# Patient Record
Sex: FEMALE | Race: WHITE | Employment: FULL TIME | URBAN - METROPOLITAN AREA
[De-identification: names, ages, dates, MRNs, and addresses within clinical notes are randomized per-mention and may not be internally consistent; named-entity substitution may affect disease eponyms.]

---

## 2017-01-10 PROBLEM — M67.432 GANGLION CYST OF VOLAR ASPECT OF LEFT WRIST: Status: ACTIVE | Noted: 2017-01-10

## 2017-01-17 ENCOUNTER — HOSPITAL ENCOUNTER (OUTPATIENT)
Dept: CT IMAGING | Facility: HOSPITAL | Age: 52
Discharge: HOME | End: 2017-01-17
Attending: INTERNAL MEDICINE

## 2017-01-17 DIAGNOSIS — Z13.9 SCREENING PROCEDURE: ICD-10-CM

## 2017-05-20 ENCOUNTER — OFFICE VISIT (OUTPATIENT)
Dept: FAMILY MEDICINE CLINIC | Facility: CLINIC | Age: 52
End: 2017-05-20

## 2017-05-20 VITALS
OXYGEN SATURATION: 98 % | HEART RATE: 92 BPM | RESPIRATION RATE: 20 BRPM | DIASTOLIC BLOOD PRESSURE: 70 MMHG | HEIGHT: 66 IN | SYSTOLIC BLOOD PRESSURE: 110 MMHG | BODY MASS INDEX: 22.5 KG/M2 | WEIGHT: 140 LBS | TEMPERATURE: 98 F

## 2017-05-20 DIAGNOSIS — J01.00 ACUTE MAXILLARY SINUSITIS, RECURRENCE NOT SPECIFIED: Primary | ICD-10-CM

## 2017-05-20 DIAGNOSIS — R05.9 COUGH: ICD-10-CM

## 2017-05-20 DIAGNOSIS — R09.89 CHEST CONGESTION: ICD-10-CM

## 2017-05-20 PROCEDURE — 99213 OFFICE O/P EST LOW 20 MIN: CPT | Performed by: NURSE PRACTITIONER

## 2017-05-20 RX ORDER — DOXYCYCLINE HYCLATE 100 MG/1
100 CAPSULE ORAL 2 TIMES DAILY
Qty: 14 CAPSULE | Refills: 0 | Status: SHIPPED | OUTPATIENT
Start: 2017-05-20 | End: 2017-05-27

## 2017-05-20 NOTE — PATIENT INSTRUCTIONS
·  PLAN: Doxycycline take as directed. Finish all the medication even if you feel better. Sunscreen during sun exposure while on medication.   · Probiotics or yogurt daily during antibiotic use may help decrease stomach upset and restore good bacteria to th

## 2017-05-20 NOTE — PROGRESS NOTES
HPI:   Kwesi Ramsey is a 46year old female who presents with ill symptoms for  8  days.  Patient reports congestion, green/yellow colored nasal discharge, dry cough, cough is not keeping pt up at night, chest pain from coughing, ear pain, OTC cold med breath with exertion, cough as above  CARDIOVASCULAR: as above in HPI  GI: no nausea or abdominal pain, appetite down  NEURO: admits to headaches    EXAM:   /70 mmHg  Temp(Src) 97.9 °F (36.6 °C) (Oral)  Resp 20  Ht 66\"  Wt 140 lb  BMI 22.61 kg/m2  L foods and soups can help with throat pain and swelling as well. · Hand washing-use hand  or wash hands frequently, cover your cough or sneeze, do not share towels or drinks with others.   · May take Delsym over the counter every 12 hours if neede

## 2017-06-01 ENCOUNTER — TELEPHONE (OUTPATIENT)
Dept: INTERNAL MEDICINE CLINIC | Facility: CLINIC | Age: 52
End: 2017-06-01

## 2017-06-01 ENCOUNTER — OFFICE VISIT (OUTPATIENT)
Dept: INTERNAL MEDICINE CLINIC | Facility: CLINIC | Age: 52
End: 2017-06-01

## 2017-06-01 VITALS
BODY MASS INDEX: 23 KG/M2 | TEMPERATURE: 98 F | DIASTOLIC BLOOD PRESSURE: 68 MMHG | SYSTOLIC BLOOD PRESSURE: 102 MMHG | WEIGHT: 140 LBS | HEART RATE: 64 BPM | RESPIRATION RATE: 16 BRPM | OXYGEN SATURATION: 96 %

## 2017-06-01 DIAGNOSIS — J06.9 ACUTE URI: Primary | ICD-10-CM

## 2017-06-01 PROCEDURE — 99213 OFFICE O/P EST LOW 20 MIN: CPT | Performed by: PHYSICIAN ASSISTANT

## 2017-06-01 RX ORDER — AZITHROMYCIN 250 MG/1
TABLET, FILM COATED ORAL
Qty: 6 TABLET | Refills: 0 | Status: SHIPPED | OUTPATIENT
Start: 2017-06-01 | End: 2017-08-21 | Stop reason: ALTCHOICE

## 2017-06-01 RX ORDER — ALBUTEROL SULFATE 90 UG/1
2 AEROSOL, METERED RESPIRATORY (INHALATION) EVERY 4 HOURS PRN
Qty: 1 INHALER | Refills: 0 | Status: ON HOLD | OUTPATIENT
Start: 2017-06-01 | End: 2017-10-25

## 2017-06-01 RX ORDER — PREDNISONE 10 MG/1
TABLET ORAL
Qty: 18 TABLET | Refills: 0 | Status: SHIPPED | OUTPATIENT
Start: 2017-06-01 | End: 2017-08-21 | Stop reason: ALTCHOICE

## 2017-06-01 NOTE — PROGRESS NOTES
HPI:   Laura Razo is a 46year old female who presents for upper respiratory symptoms for  3  weeks.    Patient reports sore throat only at the beginning of sx's, congestion, cough with sputum production at times, OTC cold meds have not been helping, Arrhythmia Father    • Cancer Father      colon   • CHF [Other] [OTHER] Maternal Grandmother    • Diabetes Mother    • Hypertension Paternal Grandmother    • Diabetes Maternal Grandfather         Smoking Status: Never Smoker                      Smokeless HFA (PROAIR HFA) 108 (90 Base) MCG/ACT Inhalation Aero Soln 1 Inhaler 0      Sig: Inhale 2 puffs into the lungs every 4 (four) hours as needed for Shortness of Breath (cough).            Imaging & Consults:  None    Return if symptoms worsen or fail to impr

## 2017-06-01 NOTE — TELEPHONE ENCOUNTER
Pt stating was seen on 5/20/17 [facility] for URI. Given Doxycycline Hyclate 100 MG and had completed on Sunday. Cough returned on Monday, deep cough. Slight SOB. No chest pain. Dull discomfort, similar to muscle ache, on back scapula. No fever at this time.  No

## 2017-08-17 ENCOUNTER — PATIENT MESSAGE (OUTPATIENT)
Dept: INTERNAL MEDICINE CLINIC | Facility: CLINIC | Age: 52
End: 2017-08-17

## 2017-08-17 NOTE — TELEPHONE ENCOUNTER
From: Efren Potts  To: Ysabel Omer PA-C  Sent: 8/17/2017 10:12 AM CDT  Subject: Other    Josem Tunica, Since May I've had symptoms like in 3/2016.  The symptoms never totally resolved and this past May I had episodes of left leg burning and on 2 o

## 2017-08-21 ENCOUNTER — OFFICE VISIT (OUTPATIENT)
Dept: INTERNAL MEDICINE CLINIC | Facility: CLINIC | Age: 52
End: 2017-08-21

## 2017-08-21 VITALS
HEIGHT: 66 IN | BODY MASS INDEX: 22.5 KG/M2 | SYSTOLIC BLOOD PRESSURE: 108 MMHG | TEMPERATURE: 99 F | WEIGHT: 140 LBS | DIASTOLIC BLOOD PRESSURE: 70 MMHG | HEART RATE: 76 BPM

## 2017-08-21 DIAGNOSIS — M54.16 LUMBAR RADICULOPATHY: Primary | ICD-10-CM

## 2017-08-21 PROCEDURE — 99213 OFFICE O/P EST LOW 20 MIN: CPT | Performed by: NURSE PRACTITIONER

## 2017-08-21 RX ORDER — METHYLPREDNISOLONE 4 MG/1
TABLET ORAL
Qty: 1 KIT | Refills: 0 | Status: SHIPPED | OUTPATIENT
Start: 2017-08-21 | End: 2017-09-07 | Stop reason: ALTCHOICE

## 2017-08-21 NOTE — PROGRESS NOTES
Alison Cunha is a 46year old female. Patient presents with:  Low Back Pain: low back pain that is going down to her hips for the last 2 weeks.  She's had back pain for 2 years but worse in the last 2 weeks      HPI:   Presents for eval of low back pa Alcohol use: Yes           1.2 oz/week     Standard drinks or equivalent: 2 per week     Comment: Cage done 8-21-17    Family History   Problem Relation Age of Onset   • Alcohol and Other Disorders Associated Maternal Grandfather    • Arrhythmia Fath on file for this visit.

## 2017-09-07 ENCOUNTER — OFFICE VISIT (OUTPATIENT)
Dept: INTERNAL MEDICINE CLINIC | Facility: CLINIC | Age: 52
End: 2017-09-07

## 2017-09-07 VITALS
SYSTOLIC BLOOD PRESSURE: 100 MMHG | WEIGHT: 147 LBS | DIASTOLIC BLOOD PRESSURE: 62 MMHG | TEMPERATURE: 99 F | RESPIRATION RATE: 16 BRPM | BODY MASS INDEX: 23.63 KG/M2 | HEIGHT: 66 IN | HEART RATE: 76 BPM

## 2017-09-07 DIAGNOSIS — G89.29 CHRONIC BILATERAL LOW BACK PAIN WITHOUT SCIATICA: Primary | ICD-10-CM

## 2017-09-07 DIAGNOSIS — M54.50 CHRONIC BILATERAL LOW BACK PAIN WITHOUT SCIATICA: Primary | ICD-10-CM

## 2017-09-07 PROCEDURE — 99213 OFFICE O/P EST LOW 20 MIN: CPT | Performed by: PHYSICIAN ASSISTANT

## 2017-09-07 RX ORDER — TIZANIDINE HYDROCHLORIDE 2 MG/1
2 CAPSULE, GELATIN COATED ORAL NIGHTLY PRN
Qty: 20 CAPSULE | Refills: 0 | Status: SHIPPED | OUTPATIENT
Start: 2017-09-07 | End: 2017-10-06

## 2017-09-07 RX ORDER — TRAMADOL HYDROCHLORIDE 50 MG/1
50 TABLET ORAL EVERY 8 HOURS PRN
Qty: 20 TABLET | Refills: 0 | Status: SHIPPED | OUTPATIENT
Start: 2017-09-07 | End: 2017-10-06 | Stop reason: ALTCHOICE

## 2017-09-07 NOTE — PROGRESS NOTES
Patient presents with: Follow - Up: back pain , still there per patient \"pretty much\" last night and this morning \"super bad\"       HPI:  Pt presents with persistent low back pain.   Feels really started about 4-5 mos ago but significantly worse over t Breath (cough). Disp: 1 Inhaler Rfl: 0   Cholecalciferol (VITAMIN D) 1000 UNITS Oral Cap Take 1 tablet by mouth daily. Disp:  Rfl:    Multiple Vitamin (STRESS FORMULA 500/BIOTIN) Oral Tab Take  by mouth.  Disp:  Rfl:        Physical Exam  /62 (BP Loca visit. All questions were answered and the patient understands the plan.

## 2017-09-08 ENCOUNTER — HOSPITAL ENCOUNTER (OUTPATIENT)
Dept: MRI IMAGING | Facility: HOSPITAL | Age: 52
Discharge: HOME | End: 2017-09-08
Attending: PHYSICIAN ASSISTANT
Payer: COMMERCIAL

## 2017-09-08 DIAGNOSIS — M54.16 LUMBAR BACK PAIN WITH RADICULOPATHY AFFECTING LEFT LOWER EXTREMITY: Primary | ICD-10-CM

## 2017-09-08 DIAGNOSIS — M54.50 CHRONIC BILATERAL LOW BACK PAIN WITHOUT SCIATICA: ICD-10-CM

## 2017-09-08 DIAGNOSIS — M51.36 DDD (DEGENERATIVE DISC DISEASE), LUMBAR: ICD-10-CM

## 2017-09-08 DIAGNOSIS — M48.061 FORAMINAL STENOSIS OF LUMBAR REGION: ICD-10-CM

## 2017-09-08 DIAGNOSIS — G89.29 CHRONIC BILATERAL LOW BACK PAIN WITHOUT SCIATICA: ICD-10-CM

## 2017-09-08 PROCEDURE — 72148 MRI LUMBAR SPINE W/O DYE: CPT | Performed by: PHYSICIAN ASSISTANT

## 2017-09-08 RX ORDER — GABAPENTIN 100 MG/1
CAPSULE ORAL
Qty: 90 CAPSULE | Refills: 0 | Status: SHIPPED | OUTPATIENT
Start: 2017-09-08 | End: 2017-10-06 | Stop reason: ALTCHOICE

## 2017-09-08 NOTE — PROGRESS NOTES
DDD, worst at L4-5 and L5-S1. Mild R foraminal encroachment at L4-5. Also with L foraminal stenosis possibly compromising the L nerve root. . Reviewed results with pt personally. She will see Neurosurg in consultation.   Ultram did not help her pain last

## 2017-09-12 ENCOUNTER — TELEPHONE (OUTPATIENT)
Dept: INTERNAL MEDICINE CLINIC | Facility: CLINIC | Age: 52
End: 2017-09-12

## 2017-09-12 NOTE — TELEPHONE ENCOUNTER
Pt [facility] so she can review them herself before her appt w/neuro next week-thanks

## 2017-09-12 NOTE — TELEPHONE ENCOUNTER
Results released to [facility] as requested. Results had been reviewed by CB with pt personally.

## 2017-09-20 ENCOUNTER — OFFICE VISIT (OUTPATIENT)
Dept: SURGERY | Facility: CLINIC | Age: 52
End: 2017-09-20

## 2017-09-20 VITALS
HEART RATE: 78 BPM | BODY MASS INDEX: 22.5 KG/M2 | WEIGHT: 140 LBS | DIASTOLIC BLOOD PRESSURE: 68 MMHG | SYSTOLIC BLOOD PRESSURE: 110 MMHG | HEIGHT: 66 IN

## 2017-09-20 DIAGNOSIS — G95.9 CERVICAL MYELOPATHY (HCC): Primary | ICD-10-CM

## 2017-09-20 DIAGNOSIS — M47.12 OSTEOARTHRITIS OF CERVICAL SPINE WITH MYELOPATHY: ICD-10-CM

## 2017-09-20 DIAGNOSIS — M51.16 LUMBAR DISC HERNIATION WITH RADICULOPATHY: ICD-10-CM

## 2017-09-20 PROCEDURE — 99214 OFFICE O/P EST MOD 30 MIN: CPT | Performed by: PHYSICIAN ASSISTANT

## 2017-09-20 RX ORDER — METHYLPREDNISOLONE 4 MG/1
TABLET ORAL
Qty: 1 PACKAGE | Refills: 0 | Status: SHIPPED | OUTPATIENT
Start: 2017-09-20 | End: 2017-10-06 | Stop reason: ALTCHOICE

## 2017-09-20 RX ORDER — IBUPROFEN 200 MG
200 TABLET ORAL EVERY 6 HOURS PRN
COMMUNITY
End: 2017-10-16

## 2017-09-20 RX ORDER — HYDROCODONE BITARTRATE AND ACETAMINOPHEN 10; 325 MG/1; MG/1
1 TABLET ORAL EVERY 6 HOURS PRN
Qty: 90 TABLET | Refills: 0 | Status: ON HOLD | OUTPATIENT
Start: 2017-09-20 | End: 2017-10-26

## 2017-09-20 RX ORDER — CYCLOBENZAPRINE HCL 10 MG
10 TABLET ORAL 3 TIMES DAILY PRN
Qty: 60 TABLET | Refills: 1 | Status: ON HOLD | OUTPATIENT
Start: 2017-09-20 | End: 2017-10-27

## 2017-09-20 NOTE — PATIENT INSTRUCTIONS
Refill policies:    • Allow 2-3 business days for refills; controlled substances may take longer.   • Contact your pharmacy at least 5 days prior to running out of medication and have them send an electronic request or submit request through the [facility] have a procedure or additional testing performed. [facility]) will contact your insurance carrier to obtain pre-certification or prior authorization.     Unfortunately, JB has seen an increase in denial of payment even though the p

## 2017-09-20 NOTE — PROGRESS NOTES
Location of Pain: neck pain radiating down left arm with numbness and tingling in left hand  Back pain radiating into left buttocks and down left leg with numbness and tingling into foot    Date Pain Began: back:April 2017, Neck: 5 days ago          Work R

## 2017-09-20 NOTE — H&P
[facility] Neurosurgery Consultation      HISTORY OF PRESENT Lilia Lyman is a 46year old RH female here for spinal consultation. She has had back pain on and off for several months. Her back pain generally is about a 4/10.   Currently she has no family history includes Alcohol and Other Disorders Associated in her maternal grandfather; Arrhythmia in her father; CHF in her maternal grandmother; Cancer in her father; Diabetes in her maternal grandfather and mother; Hypertension in her paternal grand HEENT:  Normocephalic, atraumatic. She sits and stands with her head tilted to the right. Her left arm pain is better with placing her left arm overhead. SKIN: Warm, dry, no rashes. NEUROLOGICAL:  This patient is alert and orientated x 3.   Speech flu AP and lateral x-rays on the CT abdomen  views from 3/20/2013 show very mild lumbar scoliosis with spondylosis at L5-S1. Normal pelvic and hip anatomy.   No apparent pars defect      MRI of the lumbar spine from 3/13/15 shows spondylosis at L4-5 L5-S1

## 2017-09-21 ENCOUNTER — HOSPITAL ENCOUNTER (OUTPATIENT)
Dept: GENERAL RADIOLOGY | Age: 52
Discharge: HOME | End: 2017-09-21
Attending: PHYSICIAN ASSISTANT
Payer: COMMERCIAL

## 2017-09-21 DIAGNOSIS — M47.12 OSTEOARTHRITIS OF CERVICAL SPINE WITH MYELOPATHY: ICD-10-CM

## 2017-09-21 DIAGNOSIS — G95.9 CERVICAL MYELOPATHY (HCC): ICD-10-CM

## 2017-09-21 PROCEDURE — 72052 X-RAY EXAM NECK SPINE 6/>VWS: CPT | Performed by: PHYSICIAN ASSISTANT

## 2017-09-22 ENCOUNTER — OFFICE VISIT (OUTPATIENT)
Dept: SURGERY | Facility: CLINIC | Age: 52
End: 2017-09-22

## 2017-09-22 VITALS — HEART RATE: 88 BPM | SYSTOLIC BLOOD PRESSURE: 108 MMHG | RESPIRATION RATE: 18 BRPM | DIASTOLIC BLOOD PRESSURE: 70 MMHG

## 2017-09-22 DIAGNOSIS — G95.9 CERVICAL MYELOPATHY (HCC): Primary | ICD-10-CM

## 2017-09-22 DIAGNOSIS — M47.12 OSTEOARTHRITIS OF CERVICAL SPINE WITH MYELOPATHY: ICD-10-CM

## 2017-09-22 DIAGNOSIS — M51.16 LUMBAR DISC HERNIATION WITH RADICULOPATHY: ICD-10-CM

## 2017-09-22 PROCEDURE — 99213 OFFICE O/P EST LOW 20 MIN: CPT | Performed by: PHYSICIAN ASSISTANT

## 2017-09-22 NOTE — PROGRESS NOTES
Patient her to discuss her cervical MRI. Patient states burning to the left arm has subsided, otherwise neck/shoulder pain is still the same.

## 2017-09-22 NOTE — PATIENT INSTRUCTIONS
Refill policies:    • Allow 2-3 business days for refills; controlled substances may take longer.   • Contact your pharmacy at least 5 days prior to running out of medication and have them send an electronic request or submit request through the [facility] have a procedure or additional testing performed. [facility]) will contact your insurance carrier to obtain pre-certification or prior authorization.     Unfortunately, JB has seen an increase in denial of payment even though the p

## 2017-09-22 NOTE — PROGRESS NOTES
[facility] Neurosurgery follow-up        HISTORY OF PRESENT Nya Rodriguez is a 46year old RH female here in follow-up. She had an MRI of her cervical spine and x-rays of her cervical spine.   She has been taking the Norco Flexeril and Medrol she is m • Personal history of urinary calculi     • Visual impairment       glasses, contacts         PAST SURGICAL HISTORY:  Past Surgical History:  4/26/13: COLONOSCOPY  2015: ERCP,DIAGNOSTIC  No date: FOREARM/WRIST SURGERY UNLISTED      Comment: right wrist fx Albuterol Sulfate HFA (PROAIR HFA) 108 (90 Base) MCG/ACT Inhalation Aero Soln Inhale 2 puffs into the lungs every 4 (four) hours as needed for Shortness of Breath (cough).  Disp: 1 Inhaler Rfl: 0      No current facility-administered medications on file tyson Right      2+           2+       1+        2+       2+           Left      2+           2+       1+        2+       2+                IMAGING:  MRI of the cervical spine 9/22/17.   Left C2-3, left C3-4, bilateral C6-7 foraminal stenosis  C3-4 central stenos 2.    Left C2-3, left C3-4, bilateral C6-7 foraminal stenosis. C3-4, C6-7 central stenosis with myelopathy  3. Left left arm radiculopathy acute  4. Thoracolumbar scoliosis  5.   L4-5, L5-S1 spondylosis with facet arthropathy and left foraminal narrowing

## 2017-09-25 ENCOUNTER — OFFICE VISIT (OUTPATIENT)
Dept: SURGERY | Facility: CLINIC | Age: 52
End: 2017-09-25

## 2017-09-25 ENCOUNTER — TELEPHONE (OUTPATIENT)
Dept: INTERNAL MEDICINE CLINIC | Facility: CLINIC | Age: 52
End: 2017-09-25

## 2017-09-25 VITALS
WEIGHT: 140 LBS | DIASTOLIC BLOOD PRESSURE: 64 MMHG | HEIGHT: 66 IN | SYSTOLIC BLOOD PRESSURE: 108 MMHG | HEART RATE: 78 BPM | RESPIRATION RATE: 16 BRPM | OXYGEN SATURATION: 98 % | BODY MASS INDEX: 22.5 KG/M2

## 2017-09-25 DIAGNOSIS — M54.12 CERVICAL RADICULOPATHY: Primary | ICD-10-CM

## 2017-09-25 DIAGNOSIS — Z13.228 SCREENING FOR ENDOCRINE, METABOLIC AND IMMUNITY DISORDER: ICD-10-CM

## 2017-09-25 DIAGNOSIS — Z13.29 SCREENING FOR ENDOCRINE, METABOLIC AND IMMUNITY DISORDER: ICD-10-CM

## 2017-09-25 DIAGNOSIS — Z13.220 SCREENING FOR LIPID DISORDERS: ICD-10-CM

## 2017-09-25 DIAGNOSIS — Z13.0 SCREENING FOR DISORDER OF BLOOD AND BLOOD-FORMING ORGANS: ICD-10-CM

## 2017-09-25 DIAGNOSIS — E04.1 THYROID NODULE: Primary | ICD-10-CM

## 2017-09-25 DIAGNOSIS — Z13.0 SCREENING FOR ENDOCRINE, METABOLIC AND IMMUNITY DISORDER: ICD-10-CM

## 2017-09-25 PROCEDURE — 99204 OFFICE O/P NEW MOD 45 MIN: CPT | Performed by: ANESTHESIOLOGY

## 2017-09-25 NOTE — TELEPHONE ENCOUNTER
Message   Received:  Today   Message Contents   Augusto Melendez PA-C  [facility] Clinical Staff             Pt was found to have 2 mm thyroid nodule on cervical MRI.  She should do full set of labs as she is overdue (CBC, CMP, lipids, TSH with reflex FT4) i

## 2017-09-25 NOTE — PATIENT INSTRUCTIONS
Refill policies:    • Allow 2-3 business days for refills; controlled substances may take longer.   • Contact your pharmacy at least 5 days prior to running out of medication and have them send an electronic request or submit request through the [facility] have a procedure or additional testing performed. [facility]) will contact your insurance carrier to obtain pre-certification or prior authorization.     Unfortunately, JB has seen an increase in denial of payment even though the p device off for procedure.         Medication:   Number of days you need to be off for the following medications:  • Aggrenox 10 days   • Agrylin (Anagrelide) 10 days   • Enbrel (Etanercept) 24 hours   • Fragmin (Dalteparin) 24 hours   • Pletal (Cilostazol) increase in your blood sugar. Contact your primary care physician if your blood sugar rises as you may require some medication adjustment.   It is normal to have increased pain at injection site for up to 3-5 days after procedure, you can use heat for the local anesthetic. Some patients choose to receive intravenous sedation that can make the procedure easier to tolerate. This type of sedation may cause amnesia and patients may not remember parts or all of the actual procedure.    How is the epidural injecti more. If three injections have not helped you very much, you may be a candidate for a different procedure. It is important to keep follow up appointment to clearly communicate with your provider how you are feeling. Will the epidural injection help me?   I

## 2017-09-25 NOTE — PROGRESS NOTES
HPI:    Patient ID: Kalie Smith is a 46year old female. HPI    Review of Systems         Current Outpatient Prescriptions:  ibuprofen 200 MG Oral Tab Take 200 mg by mouth every 6 (six) hours as needed for Pain.  Disp:  Rfl:    methylPREDNISolone ( Location of Pain: neck and left arm    Date Pain Began: 9/2016          Work Related:   No        Receiving Work Comp/Disability:   No    Numeric Rating Scale:  Pain at Present:  2

## 2017-09-25 NOTE — H&P
Name: Sahara Gorman   : 1965   DOS: 2017     Chief complaint: Neck pain   History of present illness:  Sahara Gorman is a 46year old female complaining of 10 day history of neck pain with radiation down the left arm.   The patient was Take 1 capsule (2 mg total) by mouth nightly as needed for Muscle spasms.  Disp: 20 capsule Rfl: 0   Albuterol Sulfate HFA (PROAIR HFA) 108 (90 Base) MCG/ACT Inhalation Aero Soln Inhale 2 puffs into the lungs every 4 (four) hours as needed for Shortness of radiculopathy on the left.     Motor Examination:    (R)   (L)  Deltoid:      5    5  Biceps:       5    5  Triceps:      5    5  Wrist Extension:     5    5  Wrist Flexsion:                 5    5  Finger Extension:     5    5  Finger Flexsion:     5    5 options are discussed in detail as well. She can see me for approximately 1 week after the injection. Thank you for this kind consultation. Christel Thomas M.D.   Pain Management

## 2017-09-26 ENCOUNTER — TELEPHONE (OUTPATIENT)
Dept: NEUROLOGY | Facility: CLINIC | Age: 52
End: 2017-09-26

## 2017-09-26 NOTE — TELEPHONE ENCOUNTER
Spoke to Bev Coffey at [facility], codes 23361 are valid and billable, no prior authorization or predetermination required.  Call reference: [id number] call time 29:36

## 2017-09-26 NOTE — TELEPHONE ENCOUNTER
Labs ordered as recommended by CB. Disucssed nodule with pt in detail. Pt agreeable to plan. Aware will [address] next year and that it is too small to biopsy at this time.

## 2017-09-27 ENCOUNTER — OFFICE VISIT (OUTPATIENT)
Dept: PHYSICAL THERAPY | Age: 52
End: 2017-09-27
Attending: PHYSICIAN ASSISTANT
Payer: COMMERCIAL

## 2017-09-27 DIAGNOSIS — G95.9 CERVICAL MYELOPATHY (HCC): ICD-10-CM

## 2017-09-27 DIAGNOSIS — M47.12 OSTEOARTHRITIS OF CERVICAL SPINE WITH MYELOPATHY: ICD-10-CM

## 2017-09-27 PROCEDURE — 97162 PT EVAL MOD COMPLEX 30 MIN: CPT | Performed by: PHYSICAL THERAPIST

## 2017-09-27 PROCEDURE — 97140 MANUAL THERAPY 1/> REGIONS: CPT | Performed by: PHYSICAL THERAPIST

## 2017-09-27 PROCEDURE — 97110 THERAPEUTIC EXERCISES: CPT | Performed by: PHYSICAL THERAPIST

## 2017-09-27 NOTE — PROGRESS NOTES
SPINE EVALUATION:   Referring Physician: Dr. Roney Patricia  Diagnosis: Cervical myelopathy, radiculopathy     Date of Service: 9/27/2017     PATIENT SUMMARY   Jose Suárez is a 46year old y/o female who presents to therapy today with complaints of gordo L lateral glide and LE strength deficits. Pt presents with return of radicular symptoms to L arm right away from supine to sitting with loading of spine.  Pt has difficulty sitting without pain, bending forward to  things, sleeping and getting through neural tension in LUE to decrease radicular symptoms. Pt responded well to manual distraction, suboccipital release and nerve glides with lower cervical lateral glides centralizing symptoms. However, return of symptoms noted in sitting.  Seated towel distra 44/100      Patient/Family/Caregiver was advised of these findings, precautions, and treatment options and has agreed to actively participate in planning and for this course of care.     Thank you for your referral. Please co-sign or sign and return this le

## 2017-09-28 ENCOUNTER — OFFICE VISIT (OUTPATIENT)
Dept: PHYSICAL THERAPY | Age: 52
End: 2017-09-28
Attending: PHYSICIAN ASSISTANT
Payer: COMMERCIAL

## 2017-09-28 PROCEDURE — 97110 THERAPEUTIC EXERCISES: CPT | Performed by: PHYSICAL THERAPIST

## 2017-09-28 PROCEDURE — 97140 MANUAL THERAPY 1/> REGIONS: CPT | Performed by: PHYSICAL THERAPIST

## 2017-09-28 NOTE — PROGRESS NOTES
Dx: Cervical myelopathy, radiculopathy         Authorized # of Visits:           Next MD visit: none scheduled  Fall Risk: standard         Precautions: n/a             Subjective: Pt reports feeling better when waking up this morning stating it was not as X 2, man there X 1       Total Timed Treatment: 45 min  Total Treatment Time: 45 min

## 2017-10-02 ENCOUNTER — OFFICE VISIT (OUTPATIENT)
Dept: PHYSICAL THERAPY | Age: 52
End: 2017-10-02
Attending: PHYSICIAN ASSISTANT
Payer: COMMERCIAL

## 2017-10-02 PROCEDURE — 97140 MANUAL THERAPY 1/> REGIONS: CPT | Performed by: PHYSICAL THERAPIST

## 2017-10-02 PROCEDURE — 97110 THERAPEUTIC EXERCISES: CPT | Performed by: PHYSICAL THERAPIST

## 2017-10-02 PROCEDURE — 97012 MECHANICAL TRACTION THERAPY: CPT | Performed by: PHYSICAL THERAPIST

## 2017-10-02 NOTE — PROGRESS NOTES
Dx: Cervical myelopathy, radiculopathy         Authorized # of Visits:           Next MD visit: none scheduled  Fall Risk: standard         Precautions: n/a             Subjective: Pt reports pain at 5/10 - significantly better since start of PT, did not h 10        Cervical distraction with retraction X 10 Mechanical traction cervical 30:5 sec on: off, 15#:5# on: off X 10 min        Man there: central PA, lateral glides, distraction X 15 min Man there: passive nerve glides with lateral cervical glides, PA,

## 2017-10-05 ENCOUNTER — OFFICE VISIT (OUTPATIENT)
Dept: PHYSICAL THERAPY | Age: 52
End: 2017-10-05
Attending: PHYSICIAN ASSISTANT
Payer: COMMERCIAL

## 2017-10-05 PROCEDURE — 97140 MANUAL THERAPY 1/> REGIONS: CPT | Performed by: PHYSICAL THERAPIST

## 2017-10-05 PROCEDURE — 97110 THERAPEUTIC EXERCISES: CPT | Performed by: PHYSICAL THERAPIST

## 2017-10-05 NOTE — PROGRESS NOTES
Dx: Cervical myelopathy, radiculopathy         Authorized # of Visits:           Next MD visit: none scheduled  Fall Risk: standard         Precautions: n/a             Subjective: Pt states she felt more tightness in neck after traction last session.  Stat abd X 15       Median nerve glide X 10 Prone gordo sh ext X 10 Prone gordo sh ext X 15       Cervical distraction with retraction X 10 Mechanical traction cervical 30:5 sec on: off, 15#:5# on: off X 10 min Man there : manual traction intermittent with central

## 2017-10-06 ENCOUNTER — OFFICE VISIT (OUTPATIENT)
Dept: SURGERY | Facility: CLINIC | Age: 52
End: 2017-10-06

## 2017-10-06 VITALS
DIASTOLIC BLOOD PRESSURE: 60 MMHG | WEIGHT: 140 LBS | BODY MASS INDEX: 22.5 KG/M2 | HEART RATE: 88 BPM | SYSTOLIC BLOOD PRESSURE: 106 MMHG | HEIGHT: 66 IN

## 2017-10-06 DIAGNOSIS — M54.12 CERVICAL RADICULOPATHY: Primary | ICD-10-CM

## 2017-10-06 DIAGNOSIS — G95.9 CERVICAL MYELOPATHY (HCC): ICD-10-CM

## 2017-10-06 PROCEDURE — 99213 OFFICE O/P EST LOW 20 MIN: CPT | Performed by: PHYSICIAN ASSISTANT

## 2017-10-06 NOTE — PATIENT INSTRUCTIONS
Refill policies:    • Allow 2-3 business days for refills; controlled substances may take longer.   • Contact your pharmacy at least 5 days prior to running out of medication and have them send an electronic request or submit request through the [facility] have a procedure or additional testing performed. [facility]) will contact your insurance carrier to obtain pre-certification or prior authorization.     Unfortunately, JB has seen an increase in denial of payment even though the p

## 2017-10-06 NOTE — PROGRESS NOTES
[facility] Neurosurgery follow-up        HISTORY OF PRESENT Moustapha Llamas is a 46year old RH female here in follow-up. She has seen Dr. Matias Primrose and is set up for cervical epidural steroid injections.     State her left arm pain has improved it is a 2/10 here for spinal consultation. She has had back pain on and off for several months. Her back pain generally is about a 4/10. Currently she has no back pain. The back pain was worse when she is lying down at night.   She is getting achiness in her bilater family history includes Alcohol and Other Disorders Associated in her maternal grandfather; Arrhythmia in her father; CHF in her maternal grandmother; Cancer in her father; Diabetes in her maternal grandfather and mother; Hypertension in her paternal grand HEENT:  Normocephalic, atraumatic. She sits and stands with her head tilted to the right. Her left arm pain is better with placing her left arm overhead. SKIN: Warm, dry, no rashes.     NEUROLOGICAL:  This patient is alert and orientated x 3.   Speech fl X-rays of cervical spine 10/18/2012 C3-4 spondylosis with retrolisthesis and probable acquired stenosis.   C5-6 C6-7 spondylosis       X-rays of the thoracic spine 10/18/2012 shows mild spondylosis with a very small scoliosis       X-ray of the pelvis and h

## 2017-10-09 ENCOUNTER — APPOINTMENT (OUTPATIENT)
Dept: PHYSICAL THERAPY | Age: 52
End: 2017-10-09
Attending: PHYSICIAN ASSISTANT
Payer: COMMERCIAL

## 2017-10-09 ENCOUNTER — TELEPHONE (OUTPATIENT)
Dept: SURGERY | Facility: CLINIC | Age: 52
End: 2017-10-09

## 2017-10-10 ENCOUNTER — TELEPHONE (OUTPATIENT)
Dept: NEUROLOGY | Facility: CLINIC | Age: 52
End: 2017-10-10

## 2017-10-12 ENCOUNTER — TELEPHONE (OUTPATIENT)
Dept: SURGERY | Facility: CLINIC | Age: 52
End: 2017-10-12

## 2017-10-12 ENCOUNTER — APPOINTMENT (OUTPATIENT)
Dept: PHYSICAL THERAPY | Age: 52
End: 2017-10-12
Attending: PHYSICIAN ASSISTANT
Payer: COMMERCIAL

## 2017-10-12 ENCOUNTER — OFFICE VISIT (OUTPATIENT)
Dept: SURGERY | Facility: CLINIC | Age: 52
End: 2017-10-12

## 2017-10-12 VITALS
HEIGHT: 66 IN | WEIGHT: 140 LBS | HEART RATE: 88 BPM | SYSTOLIC BLOOD PRESSURE: 114 MMHG | DIASTOLIC BLOOD PRESSURE: 76 MMHG | BODY MASS INDEX: 22.5 KG/M2

## 2017-10-12 DIAGNOSIS — M54.12 CERVICAL RADICULOPATHY: ICD-10-CM

## 2017-10-12 DIAGNOSIS — G95.9 CERVICAL MYELOPATHY (HCC): Primary | ICD-10-CM

## 2017-10-12 DIAGNOSIS — M47.12 OSTEOARTHRITIS OF CERVICAL SPINE WITH MYELOPATHY: ICD-10-CM

## 2017-10-12 PROCEDURE — 99214 OFFICE O/P EST MOD 30 MIN: CPT | Performed by: PHYSICIAN ASSISTANT

## 2017-10-12 NOTE — PROGRESS NOTES
Here for surgical discussion. Upper extremity weakness has not changed since last visit. Occasional tingling to lower extremities.

## 2017-10-12 NOTE — PROGRESS NOTES
[facility] Neurosurgery follow-up        HISTORY OF PRESENT Lilia Lyman is a 46year old RH female here in follow-up. Last hx  She has seen Dr. Eddy Ying and is set up for cervical epidural steroid injections.     State her left arm pain has improved here for spinal consultation. She has had back pain on and off for several months. Her back pain generally is about a 4/10. Currently she has no back pain. The back pain was worse when she is lying down at night.   She is getting achiness in her bilater family history includes Alcohol and Other Disorders Associated in her maternal grandfather; Arrhythmia in her father; CHF in her maternal grandmother; Cancer in her father; Diabetes in her maternal grandfather and mother; Hypertension in her paternal grand HEENT:  Normocephalic, atraumatic. She sits and stands with her head tilted to the right. Her left arm pain is better with placing her left arm overhead. SKIN: Warm, dry, no rashes.     NEUROLOGICAL:  This patient is alert and orientated x 3.   Speech fl X-rays of cervical spine 10/18/2012 C3-4 spondylosis with retrolisthesis and probable acquired stenosis.   C5-6 C6-7 spondylosis       X-rays of the thoracic spine 10/18/2012 shows mild spondylosis with a very small scoliosis       X-ray of the pelvis and h Risks and benefits were discussed at length. To included expected outcome from surgery, unexpected outcomes from surgery, and associated risks with any surgical procedure.  To include infection, neurological injury, failure to resolve pain or symptoms, and

## 2017-10-16 ENCOUNTER — APPOINTMENT (OUTPATIENT)
Dept: PHYSICAL THERAPY | Age: 52
End: 2017-10-16
Attending: PHYSICIAN ASSISTANT
Payer: COMMERCIAL

## 2017-10-16 NOTE — TELEPHONE ENCOUNTER
LMTCB. Will send preop instructions via [facility] along with information on the collar and vendor information. Orders placed for cervical collar and External bone growth stimulator and faxed to vendors. Letter sent to PCP office for medical clearance.

## 2017-10-18 ENCOUNTER — LABORATORY ENCOUNTER (OUTPATIENT)
Dept: LAB | Facility: HOSPITAL | Age: 52
End: 2017-10-18
Attending: NEUROLOGICAL SURGERY
Payer: COMMERCIAL

## 2017-10-18 ENCOUNTER — HOSPITAL ENCOUNTER (OUTPATIENT)
Dept: PHYSICAL THERAPY | Facility: HOSPITAL | Age: 52
Discharge: HOME | End: 2017-10-18
Attending: NEUROLOGICAL SURGERY
Payer: COMMERCIAL

## 2017-10-18 ENCOUNTER — TELEPHONE (OUTPATIENT)
Dept: SURGERY | Facility: CLINIC | Age: 52
End: 2017-10-18

## 2017-10-18 DIAGNOSIS — G95.9 CERVICAL MYELOPATHY (HCC): ICD-10-CM

## 2017-10-18 DIAGNOSIS — Z13.0 SCREENING FOR ENDOCRINE, METABOLIC AND IMMUNITY DISORDER: ICD-10-CM

## 2017-10-18 DIAGNOSIS — Z13.0 SCREENING FOR DISORDER OF BLOOD AND BLOOD-FORMING ORGANS: ICD-10-CM

## 2017-10-18 DIAGNOSIS — Z13.228 SCREENING FOR ENDOCRINE, METABOLIC AND IMMUNITY DISORDER: ICD-10-CM

## 2017-10-18 DIAGNOSIS — M54.12 CERVICAL RADICULOPATHY: ICD-10-CM

## 2017-10-18 DIAGNOSIS — Z13.29 SCREENING FOR ENDOCRINE, METABOLIC AND IMMUNITY DISORDER: ICD-10-CM

## 2017-10-18 DIAGNOSIS — Z13.220 SCREENING FOR LIPID DISORDERS: ICD-10-CM

## 2017-10-18 DIAGNOSIS — E04.1 THYROID NODULE: ICD-10-CM

## 2017-10-18 PROCEDURE — 85730 THROMBOPLASTIN TIME PARTIAL: CPT

## 2017-10-18 PROCEDURE — 87081 CULTURE SCREEN ONLY: CPT

## 2017-10-18 PROCEDURE — 86850 RBC ANTIBODY SCREEN: CPT

## 2017-10-18 PROCEDURE — 36415 COLL VENOUS BLD VENIPUNCTURE: CPT

## 2017-10-18 PROCEDURE — 80061 LIPID PANEL: CPT

## 2017-10-18 PROCEDURE — 86900 BLOOD TYPING SEROLOGIC ABO: CPT

## 2017-10-18 PROCEDURE — 85025 COMPLETE CBC W/AUTO DIFF WBC: CPT

## 2017-10-18 PROCEDURE — 86901 BLOOD TYPING SEROLOGIC RH(D): CPT

## 2017-10-18 PROCEDURE — 80053 COMPREHEN METABOLIC PANEL: CPT

## 2017-10-18 PROCEDURE — 85610 PROTHROMBIN TIME: CPT

## 2017-10-18 PROCEDURE — 84443 ASSAY THYROID STIM HORMONE: CPT

## 2017-10-18 NOTE — PROGRESS NOTES
Labs OK other than elevated TGs. Watch carbs and simple sugars. Recheck in 1 year. Other labs OK/stable.

## 2017-10-18 NOTE — TELEPHONE ENCOUNTER
Auth # [id number] for 1 inpatient day, certified up to 85/38, additional days check with the hospital.    PCP clearance pending.

## 2017-10-18 NOTE — TELEPHONE ENCOUNTER
Patient came into the office and asked about hibiclens. Informed her she can get this OTC at any local pharmacy by the first aide supplies.  Also, informed patient of below insurance authorization and reminded her that she also needs to f/u with her PCP for

## 2017-10-19 ENCOUNTER — APPOINTMENT (OUTPATIENT)
Dept: PHYSICAL THERAPY | Age: 52
End: 2017-10-19
Attending: PHYSICIAN ASSISTANT
Payer: COMMERCIAL

## 2017-10-20 NOTE — TELEPHONE ENCOUNTER
Spoke with patient and informed her that paperwork is complete and she can  copy. Patient also asked if we could fax to [employer]. Fax # [phone]. Copy sent to scanning and copy left in  for patient to .

## 2017-10-23 ENCOUNTER — OFFICE VISIT (OUTPATIENT)
Dept: INTERNAL MEDICINE CLINIC | Facility: CLINIC | Age: 52
End: 2017-10-23

## 2017-10-23 VITALS
TEMPERATURE: 99 F | HEART RATE: 92 BPM | WEIGHT: 146 LBS | SYSTOLIC BLOOD PRESSURE: 112 MMHG | RESPIRATION RATE: 17 BRPM | BODY MASS INDEX: 23.46 KG/M2 | HEIGHT: 66 IN | DIASTOLIC BLOOD PRESSURE: 64 MMHG

## 2017-10-23 DIAGNOSIS — Z01.818 PRE-OP EXAMINATION: ICD-10-CM

## 2017-10-23 DIAGNOSIS — M50.20 HERNIATED CERVICAL DISC: Primary | ICD-10-CM

## 2017-10-23 DIAGNOSIS — M54.12 CERVICAL RADICULOPATHY: ICD-10-CM

## 2017-10-23 PROCEDURE — 99243 OFF/OP CNSLTJ NEW/EST LOW 30: CPT | Performed by: INTERNAL MEDICINE

## 2017-10-23 NOTE — PROGRESS NOTES
Patient presents with:  Pre-Op Exam: ROOM 8      HPI:  Here for pre op for cervical disc herniation with radiculopathy, having cervical fusion on Wednesday. Pt is doing well other than neck pain with radicular symptoms. Labs reviewed wnl.      Review of Sys History:  4/26/13: COLONOSCOPY  2015: ERCP,DIAGNOSTIC  No date: FOREARM/WRIST SURGERY UNLISTED      Comment: right wrist fx repair  2015, 2016: FOREARM/WRIST SURGERY UNLISTED Left      Comment: left wrist ganglion cyst excision   No date: OTHER SURGICAL HI (Oral)   Resp 17   Ht 66\"   Wt 146 lb   LMP 05/28/2014   BMI 23.57 kg/m²   Constitutional: Oriented to person, place, and time. No distress. HEENT:  Normocephalic and atraumatic.  Hearing and tympanic membranes normal.  Nose normal. Oropharynx is clear a

## 2017-10-23 NOTE — TELEPHONE ENCOUNTER
Per [facility] today 10/23/17: \"Pt is acceptable risk for surgery, no additional testing needed. Labs wnl. \"    Nothing further needed for upcoming surgery.

## 2017-10-25 ENCOUNTER — APPOINTMENT (OUTPATIENT)
Dept: GENERAL RADIOLOGY | Facility: HOSPITAL | Age: 52
DRG: 472 | End: 2017-10-25
Attending: NEUROLOGICAL SURGERY
Payer: COMMERCIAL

## 2017-10-25 ENCOUNTER — HOSPITAL ENCOUNTER (INPATIENT)
Facility: HOSPITAL | Age: 52
LOS: 1 days | Discharge: HOME | DRG: 472 | End: 2017-10-27
Attending: NEUROLOGICAL SURGERY | Admitting: NEUROLOGICAL SURGERY
Payer: COMMERCIAL

## 2017-10-25 ENCOUNTER — SURGERY (OUTPATIENT)
Age: 52
End: 2017-10-25

## 2017-10-25 DIAGNOSIS — G95.9 CERVICAL MYELOPATHY (HCC): Primary | ICD-10-CM

## 2017-10-25 DIAGNOSIS — M54.12 CERVICAL RADICULOPATHY: ICD-10-CM

## 2017-10-25 PROCEDURE — 76001 XR FLUOROSCOPE EXAM >1 HR EXTENSIVE (CPT=76001): CPT | Performed by: NEUROLOGICAL SURGERY

## 2017-10-25 PROCEDURE — 0RB30ZZ EXCISION OF CERVICAL VERTEBRAL DISC, OPEN APPROACH: ICD-10-PCS | Performed by: NEUROLOGICAL SURGERY

## 2017-10-25 PROCEDURE — 0RG20K0 FUSION OF 2 OR MORE CERVICAL VERTEBRAL JOINTS WITH NONAUTOLOGOUS TISSUE SUBSTITUTE, ANTERIOR APPROACH, ANTERIOR COLUMN, OPEN APPROACH: ICD-10-PCS | Performed by: NEUROLOGICAL SURGERY

## 2017-10-25 PROCEDURE — 00NW0ZZ RELEASE CERVICAL SPINAL CORD, OPEN APPROACH: ICD-10-PCS | Performed by: NEUROLOGICAL SURGERY

## 2017-10-25 DEVICE — SCREW EAGLE+SWIFT PRM ST 12: Type: IMPLANTABLE DEVICE | Site: NECK | Status: FUNCTIONAL

## 2017-10-25 DEVICE — BONE CERV 5 LIFENET VG2C-T57: Type: IMPLANTABLE DEVICE | Site: NECK | Status: FUNCTIONAL

## 2017-10-25 DEVICE — EAGLE/SWIFT PLUS ANTERIOR CERVICAL PLATE STRAIGHT - TEMPORARY FIXATION PIN
Type: IMPLANTABLE DEVICE | Site: NECK | Status: FUNCTIONAL
Brand: EAGLE SWIFT

## 2017-10-25 DEVICE — SCREW EAGLE+SWIFT PRM ST 14: Type: IMPLANTABLE DEVICE | Site: NECK | Status: FUNCTIONAL

## 2017-10-25 RX ORDER — HYDROMORPHONE HYDROCHLORIDE 1 MG/ML
0.4 INJECTION, SOLUTION INTRAMUSCULAR; INTRAVENOUS; SUBCUTANEOUS EVERY 5 MIN PRN
Status: DISPENSED | OUTPATIENT
Start: 2017-10-25 | End: 2017-10-26

## 2017-10-25 RX ORDER — DEXAMETHASONE SODIUM PHOSPHATE 4 MG/ML
4 VIAL (ML) INJECTION AS NEEDED
Status: ACTIVE | OUTPATIENT
Start: 2017-10-25 | End: 2017-10-26

## 2017-10-25 RX ORDER — MIDAZOLAM HYDROCHLORIDE 1 MG/ML
1 INJECTION INTRAMUSCULAR; INTRAVENOUS EVERY 5 MIN PRN
Status: ACTIVE | OUTPATIENT
Start: 2017-10-25 | End: 2017-10-26

## 2017-10-25 RX ORDER — NALOXONE HYDROCHLORIDE 0.4 MG/ML
80 INJECTION, SOLUTION INTRAMUSCULAR; INTRAVENOUS; SUBCUTANEOUS AS NEEDED
Status: ACTIVE | OUTPATIENT
Start: 2017-10-25 | End: 2017-10-26

## 2017-10-25 RX ORDER — CEFAZOLIN SODIUM 1 G/3ML
INJECTION, POWDER, FOR SOLUTION INTRAMUSCULAR; INTRAVENOUS
Status: DISCONTINUED | OUTPATIENT
Start: 2017-10-25 | End: 2017-10-25 | Stop reason: HOSPADM

## 2017-10-25 RX ORDER — ONDANSETRON 2 MG/ML
4 INJECTION INTRAMUSCULAR; INTRAVENOUS AS NEEDED
Status: ACTIVE | OUTPATIENT
Start: 2017-10-25 | End: 2017-10-26

## 2017-10-25 RX ORDER — METOCLOPRAMIDE HYDROCHLORIDE 5 MG/ML
10 INJECTION INTRAMUSCULAR; INTRAVENOUS AS NEEDED
Status: ACTIVE | OUTPATIENT
Start: 2017-10-25 | End: 2017-10-26

## 2017-10-25 RX ORDER — BACITRACIN 50000 [USP'U]/1
INJECTION, POWDER, LYOPHILIZED, FOR SOLUTION INTRAMUSCULAR AS NEEDED
Status: DISCONTINUED | OUTPATIENT
Start: 2017-10-25 | End: 2017-10-25 | Stop reason: HOSPADM

## 2017-10-25 RX ORDER — SODIUM CHLORIDE, SODIUM LACTATE, POTASSIUM CHLORIDE, CALCIUM CHLORIDE 600; 310; 30; 20 MG/100ML; MG/100ML; MG/100ML; MG/100ML
INJECTION, SOLUTION INTRAVENOUS CONTINUOUS
Status: DISCONTINUED | OUTPATIENT
Start: 2017-10-25 | End: 2017-10-27

## 2017-10-25 RX ORDER — DIPHENHYDRAMINE HYDROCHLORIDE 50 MG/ML
12.5 INJECTION INTRAMUSCULAR; INTRAVENOUS AS NEEDED
Status: ACTIVE | OUTPATIENT
Start: 2017-10-25 | End: 2017-10-26

## 2017-10-25 RX ORDER — MEPERIDINE HYDROCHLORIDE 25 MG/ML
12.5 INJECTION INTRAMUSCULAR; INTRAVENOUS; SUBCUTANEOUS AS NEEDED
Status: DISCONTINUED | OUTPATIENT
Start: 2017-10-25 | End: 2017-10-26 | Stop reason: HOSPADM

## 2017-10-25 RX ORDER — 0.9 % SODIUM CHLORIDE 0.9 %
VIAL (ML) INJECTION AS NEEDED
Status: DISCONTINUED | OUTPATIENT
Start: 2017-10-25 | End: 2017-10-25 | Stop reason: HOSPADM

## 2017-10-25 RX ORDER — HYDROMORPHONE HYDROCHLORIDE 1 MG/ML
INJECTION, SOLUTION INTRAMUSCULAR; INTRAVENOUS; SUBCUTANEOUS
Status: COMPLETED
Start: 2017-10-25 | End: 2017-10-25

## 2017-10-25 RX ORDER — SODIUM CHLORIDE 9 MG/ML
INJECTION, SOLUTION INTRAVENOUS CONTINUOUS
Status: DISCONTINUED | OUTPATIENT
Start: 2017-10-25 | End: 2017-10-27

## 2017-10-25 NOTE — H&P
[id number]   History and Physical    Herminio Jordan Patient Status:  Surgery Admit    1965 MRN [id number]   Location [address] [place] [address] Attending Milagros Leroy MD   Hosp Day # 0 PCP Ed Bose here for spinal consultation. Gael Aranda has had back pain on and off for several months. Fredy Roe back pain generally is about a 4/10.  Currently she has no back pain.  The back pain was worse when she is lying down at night.  She is getting achiness in her bilater family history includes Alcohol and Other Disorders Associated in her maternal grandfather; Arrhythmia in her father; CHF in her maternal grandmother; Cancer in her father; Diabetes in her maternal grandfather and mother; Hypertension in her paternal grand Upper extremity strength:     Deltoid Triceps Biceps   Wrist Extension  Finger extension Thumb Abduction  Thumb adduction Intrinsics   Right         5-        2+      4+    4+ 5 4+ 4 5 4+   Left         5-        4-       4    4+ 5 4- 4 5 4+      Lower

## 2017-10-26 ENCOUNTER — APPOINTMENT (OUTPATIENT)
Dept: GENERAL RADIOLOGY | Facility: HOSPITAL | Age: 52
DRG: 472 | End: 2017-10-26
Attending: NURSE PRACTITIONER
Payer: COMMERCIAL

## 2017-10-26 PROCEDURE — 72040 X-RAY EXAM NECK SPINE 2-3 VW: CPT | Performed by: NURSE PRACTITIONER

## 2017-10-26 PROCEDURE — 99233 SBSQ HOSP IP/OBS HIGH 50: CPT | Performed by: HOSPITALIST

## 2017-10-26 RX ORDER — METOCLOPRAMIDE HYDROCHLORIDE 5 MG/ML
10 INJECTION INTRAMUSCULAR; INTRAVENOUS EVERY 6 HOURS PRN
Status: DISCONTINUED | OUTPATIENT
Start: 2017-10-26 | End: 2017-10-27

## 2017-10-26 RX ORDER — HYDROCODONE BITARTRATE AND ACETAMINOPHEN 10; 325 MG/1; MG/1
2 TABLET ORAL EVERY 4 HOURS PRN
Status: DISCONTINUED | OUTPATIENT
Start: 2017-10-26 | End: 2017-10-27

## 2017-10-26 RX ORDER — HYDROCODONE BITARTRATE AND ACETAMINOPHEN 10; 325 MG/1; MG/1
1 TABLET ORAL EVERY 4 HOURS PRN
Status: DISCONTINUED | OUTPATIENT
Start: 2017-10-26 | End: 2017-10-27

## 2017-10-26 RX ORDER — DIPHENHYDRAMINE HYDROCHLORIDE 50 MG/ML
25 INJECTION INTRAMUSCULAR; INTRAVENOUS EVERY 4 HOURS PRN
Status: DISCONTINUED | OUTPATIENT
Start: 2017-10-26 | End: 2017-10-27

## 2017-10-26 RX ORDER — BISACODYL 10 MG
10 SUPPOSITORY, RECTAL RECTAL
Status: DISCONTINUED | OUTPATIENT
Start: 2017-10-26 | End: 2017-10-27

## 2017-10-26 RX ORDER — ACETAMINOPHEN 325 MG/1
650 TABLET ORAL EVERY 4 HOURS PRN
Status: DISCONTINUED | OUTPATIENT
Start: 2017-10-26 | End: 2017-10-27

## 2017-10-26 RX ORDER — DIPHENHYDRAMINE HCL 25 MG
25 CAPSULE ORAL EVERY 4 HOURS PRN
Status: DISCONTINUED | OUTPATIENT
Start: 2017-10-26 | End: 2017-10-27

## 2017-10-26 RX ORDER — HYDROMORPHONE HYDROCHLORIDE 1 MG/ML
0.2 INJECTION, SOLUTION INTRAMUSCULAR; INTRAVENOUS; SUBCUTANEOUS EVERY 2 HOUR PRN
Status: DISCONTINUED | OUTPATIENT
Start: 2017-10-26 | End: 2017-10-27

## 2017-10-26 RX ORDER — SENNOSIDES 8.6 MG
17.2 TABLET ORAL NIGHTLY
Status: DISCONTINUED | OUTPATIENT
Start: 2017-10-26 | End: 2017-10-27

## 2017-10-26 RX ORDER — HYDROMORPHONE HYDROCHLORIDE 1 MG/ML
0.8 INJECTION, SOLUTION INTRAMUSCULAR; INTRAVENOUS; SUBCUTANEOUS EVERY 2 HOUR PRN
Status: DISCONTINUED | OUTPATIENT
Start: 2017-10-26 | End: 2017-10-27

## 2017-10-26 RX ORDER — DOCUSATE SODIUM 100 MG/1
100 CAPSULE, LIQUID FILLED ORAL 2 TIMES DAILY
Status: DISCONTINUED | OUTPATIENT
Start: 2017-10-26 | End: 2017-10-27

## 2017-10-26 RX ORDER — DEXAMETHASONE SODIUM PHOSPHATE 10 MG/ML
10 INJECTION, SOLUTION INTRAMUSCULAR; INTRAVENOUS EVERY 8 HOURS
Status: COMPLETED | OUTPATIENT
Start: 2017-10-26 | End: 2017-10-26

## 2017-10-26 RX ORDER — SODIUM PHOSPHATE, DIBASIC AND SODIUM PHOSPHATE, MONOBASIC 7; 19 G/133ML; G/133ML
1 ENEMA RECTAL ONCE AS NEEDED
Status: DISCONTINUED | OUTPATIENT
Start: 2017-10-26 | End: 2017-10-27

## 2017-10-26 RX ORDER — HYDROCODONE BITARTRATE AND ACETAMINOPHEN 10; 325 MG/1; MG/1
1-2 TABLET ORAL EVERY 6 HOURS PRN
Qty: 40 TABLET | Refills: 0 | Status: ON HOLD | OUTPATIENT
Start: 2017-10-26 | End: 2017-12-08

## 2017-10-26 RX ORDER — CYCLOBENZAPRINE HCL 10 MG
10 TABLET ORAL EVERY 8 HOURS PRN
Status: DISCONTINUED | OUTPATIENT
Start: 2017-10-26 | End: 2017-10-27

## 2017-10-26 RX ORDER — ONDANSETRON 2 MG/ML
4 INJECTION INTRAMUSCULAR; INTRAVENOUS EVERY 4 HOURS PRN
Status: ACTIVE | OUTPATIENT
Start: 2017-10-26 | End: 2017-10-27

## 2017-10-26 RX ORDER — POLYETHYLENE GLYCOL 3350 17 G/17G
17 POWDER, FOR SOLUTION ORAL DAILY PRN
Status: DISCONTINUED | OUTPATIENT
Start: 2017-10-26 | End: 2017-10-27

## 2017-10-26 RX ORDER — HYDROMORPHONE HYDROCHLORIDE 1 MG/ML
0.4 INJECTION, SOLUTION INTRAMUSCULAR; INTRAVENOUS; SUBCUTANEOUS EVERY 2 HOUR PRN
Status: DISCONTINUED | OUTPATIENT
Start: 2017-10-26 | End: 2017-10-27

## 2017-10-26 NOTE — OCCUPATIONAL THERAPY NOTE
OCCUPATIONAL THERAPY QUICK EVALUATION - INPATIENT    Room Number: 381/381-A  Evaluation Date: 10/26/2017     Type of Evaluation: Quick eval  Presenting Problem: cervical myelopathy and radiculopathy s/p C3-C4, C4-C5, C5-C6, C6-C7 discectomy and fusion w/ a Patient works full time and is very active. SUBJECTIVE   \"My legs feel very heavy. \"    Patient self-stated goal is to go home.     OBJECTIVE  Precautions: Spine;Cervical brace (may remove collar for meals and showers only)  Fall Risk: Standard fall ris session/findings; All patient questions and concerns addressed    ASSESSMENT     Patient is a 46year old female admitted on 10/25/2017 s/p  C3-C7 ACDF. Complete medical history and occupational profile noted above.  Functional outcome measures completed inc

## 2017-10-26 NOTE — OPERATIVE REPORT
[facility]    OPERATIVE REPORT    Patient:  Max Sands;  YOB: 1965     CSN:  [id number]; Medical Record Number:  [id number]    Admission Date:  10/25/2017 Operation Date:  10/25/2017    . ..........................     Operating y motor evoked potentials. The arms were padded at the elbows and wrists with foam and gently tucked at the patient's side in anatomic alignment.  Tegaderm was applied to the shoulders protect the skin and adhesive tape was used for shoulder traction to expos below the interspace and traction applied to open up the disc space.  The operative microscope was brought in the field and under operative magnification aggressive discectomy including removal of the cartilaginous but not the bony endplate of the vertebral stabilized by slight distraction applied to the vertebral body pins, the remaining bony endplates were prepared with increasing size bone rasps.  Microsurgical drilling was carried out as needed to ensure flat and parallel endplates for placement of the bon the midline. The platysma was then closed with a running 3-0 Vicryl suture to bring the associated sternocleidomastoid muscle back into anatomic position. The subcutaneous fat and dermis were re approximated with 3-0 Vicryl suture.  A final skin closure wit

## 2017-10-26 NOTE — PHYSICAL THERAPY NOTE
PHYSICAL THERAPY EVALUATION - INPATIENT     Room Number: 381/381-A  Evaluation Date: 10/26/2017  Type of Evaluation: Initial  Physician Order: PT Eval and Treat    Presenting Problem: s/p C3-7 ACDF 10/25/17  Reason for Therapy: Mobility Dysfunction and showers only)  Fall Risk: Standard fall risk    WEIGHT BEARING RESTRICTION  Weight Bearing Restriction: None                PAIN ASSESSMENT  Ratin  Location: chest/neck  Management Techniques: Body mechanics; Activity promotion    COGNITION  · Overall C Ambulated 300 feet total sans AD with CGA for safety precautions due to report of LEs feeling \"heavy. \" Asc/desc 14 stair with 1 rail and CGA, again for safety precautions.  Pt negotiated stairs with reciprocal gait pattern, no knee buckling noted with eit post-op instructions, and to request supervision from nursing if feeling uncertain due to BLE \"heaviness. \"  DISCHARGE RECOMMENDATIONS  PT Discharge Recommendations: Home    PLAN  PT Treatment Plan: Gait training;Patient education  Rehab Potential : Good

## 2017-10-26 NOTE — CONSULTS
[facility]  [address] [place] [address] Patient Status:  Inpatient    1965 MRN [id number]   [facility] 3SW-A Attending Karyl Bence, MD   Hosp Day # 0 PCP Amanda Lau MD     Reason for consult: Medical management • CHF [OTHER] Maternal Grandmother    • Diabetes Mother    • Hypertension Paternal Grandmother    • Diabetes Maternal Grandfather        Allergies:   Adhesive Tape           Rash    Comment:Blisters             Paper tape and tegaderm are OK    Medicatio affect. Diagnostic Data:      Labs:  No results for input(s): WBC, HGB, MCV, PLT, BAND, INR in the last 72 hours.     Invalid input(s): LYM#, MONO#, BASOS#, EOSIN#    No results for input(s): GLU, BUN, CREATSERUM, GFRAA, GFRNAA, CA, ALB, NA, K, CL, CO2

## 2017-10-26 NOTE — PROGRESS NOTES
Patient reporting a heaviness to bilateral legs when ambulating with PT this am. Notified APN of patient report. Post op XR completed and on chart, APN will review and discuss with Dr Lance Blizzard. 1400- received callback from APN. Imaging looks fine.  Heavy

## 2017-10-26 NOTE — PROGRESS NOTES
Patient seen and examined  Doing well  On room air  Pain controlled with analgesics  Has anterior chest wall pain  No shortness of breath  No nausea, vomiting  Tolerating diet  /61 (BP Location: Right arm)   Pulse 87   Temp 97.7 °F (36.5 °C) (Oral)

## 2017-10-26 NOTE — PROGRESS NOTES
[id number] Neurosurgery Progress Note    Antonio Fabroseanna Patient Status:  Inpatient    1965 MRN [id number]   [facility] 3SW-A Attending Antonieta Pro MD   Hosp Day # 0 PCP Aurea Rankin MD     Subjective:  Martín Myers at [address] [facility], ANNI GARZA [facility]  [facility]  Pager [phone]  10/26/2017, 9:01 AM

## 2017-10-26 NOTE — PROGRESS NOTES
POD #1 spine newsletter and swallowing precautions provided to patient. Reviewed indications, side effects of pain medication/narcotics and constipation prevention.  Stressed importance of increased fluids/roughage in diet, continued use stool softeners donnie

## 2017-10-27 VITALS
OXYGEN SATURATION: 98 % | DIASTOLIC BLOOD PRESSURE: 71 MMHG | TEMPERATURE: 99 F | RESPIRATION RATE: 20 BRPM | SYSTOLIC BLOOD PRESSURE: 119 MMHG | BODY MASS INDEX: 23.13 KG/M2 | HEART RATE: 85 BPM | WEIGHT: 143.94 LBS | HEIGHT: 66 IN

## 2017-10-27 RX ORDER — CYCLOBENZAPRINE HCL 10 MG
10 TABLET ORAL 3 TIMES DAILY PRN
Qty: 60 TABLET | Refills: 0 | Status: SHIPPED | OUTPATIENT
Start: 2017-10-27 | End: 2017-12-08

## 2017-10-27 NOTE — PHYSICAL THERAPY NOTE
PHYSICAL THERAPY TREATMENT NOTE - INPATIENT    Room Number: 381/381-A     Session: 1   Number of Visits to Meet Established Goals: 2    Presenting Problem: s/p C3-7 ACDF 10/25/17  History related to current admission: Pt s/p C3-& ACDF 10/25/17.  Reported L MOBILITY  How much difficulty does the patient currently have. ..  -   Turning over in bed (including adjusting bedclothes, sheets and blankets)?: None   -   Sitting down on and standing up from a chair with arms (e.g., wheelchair, bedside commode, etc.): N Plan: Gait training;Patient education  Rehab Potential : Good  Frequency (Obs): Daily    CURRENT GOALS     Goal #1 Patient is able to asc/desc 16 stairs with 1 railing at assistance level: modified independent  Met   Goal #2 Patient is able to ambulate 500

## 2017-10-27 NOTE — PAYOR COMM NOTE
--------------  DISCHARGE REVIEW    Payor: Sol ROBISON PPO.[employer]  Subscriber #:  [id number]  Authorization Number: [id number]    Admit date: 10/26/17  Admit time:  0926  Discharge Date: 10/27/2017 12:40 PM     Admitting Physician: Alex Thomas

## 2017-10-27 NOTE — PROGRESS NOTES
[address] Neurosurgery Progress Note    Halima Woo Patient Status:  Inpatient    1965 MRN [id number]   [facility] 3SW-A Attending Alice Mazariegos MD   Hosp Day # 1 PCP Hugo Limon MD     Subjective:  Justo Velazquez pain    Plan:  1. Cervical Collar on at all times, may remove for showers and meals only  2. OOB to chair for all meals  3. Ambulate QID  4. PT/OT daily  5. Pain control    She is cleared for d/c today. All d/c and wound care instructions provided.   F/U w

## 2017-10-28 NOTE — PLAN OF CARE
Vitals stable, pain well controlled on PO pain meds, no difficulty swallowing, moving all extremities well. IV site discontinued. Discharge instructions reviewed with patient, including medications verbalized understanding.   Prescriptions given to zach

## 2017-10-30 ENCOUNTER — PATIENT OUTREACH (OUTPATIENT)
Dept: CASE MANAGEMENT | Age: 52
End: 2017-10-30

## 2017-10-30 ENCOUNTER — TELEPHONE (OUTPATIENT)
Dept: CASE MANAGEMENT | Age: 52
End: 2017-10-30

## 2017-10-30 ENCOUNTER — TELEPHONE (OUTPATIENT)
Dept: SURGERY | Facility: CLINIC | Age: 52
End: 2017-10-30

## 2017-10-30 DIAGNOSIS — Z02.9 ENCOUNTERS FOR ADMINISTRATIVE PURPOSE: ICD-10-CM

## 2017-10-30 NOTE — PROGRESS NOTES
Initial Post Discharge Follow Up   Discharge Date: 10/27/17  Contact Date: 10/30/2017    Consent Verification:  Assessment Completed With: Patient  HIPAA Verified?   Yes    Discharge Dx:   Cervical myelopathy,  C3-7 Anterior Cervical Discectomy and Fusio Disp:  Rfl:    Multiple Vitamin (STRESS FORMULA 500/BIOTIN) Oral Tab Take  by mouth.  Disp:  Rfl:      • When you were leaving the hospital were any medication changes discussed with you? yes  • If you were prescribed a new medication:   o Was the new medic follow up appointments. Pt is aware she needs to schedule with Dr. Levon Hinds within a week however the pt stated she prefers to see Dr. Tabatha Gregory first on 11/9/17 and then FU with her PCP. TE sent to PCP office.    Your appointments     Date & Time Appointment

## 2017-10-30 NOTE — TELEPHONE ENCOUNTER
Contacted pt for TCM. Pt currently does not have her TCM/HFU appointment scheduled. Offered an appointment but pt refused stating she would like to FU with her surgeon first on 11/9/17 and then she will schedule with her PCP.  Encouraged pt to schedule an a

## 2017-10-30 NOTE — TELEPHONE ENCOUNTER
Spoke with nurse  and informed her that bone growth stimulator was placed and faxed on 10/16/17. No further questions at this time.

## 2017-11-01 NOTE — DISCHARGE SUMMARY
[facility]  Discharge Summary    Marycarmen Hanson Patient Status:  Inpatient    1965 MRN [id number]   [facility] 3SW-A Attending No att. providers found   Hosp Day # 1 PCP Amanda Lau MD     Date of Admission: 10/25/2017    D  Her back pain generally is about a 4/10.  Currently she has no back pain.  The back pain was worse when she is lying down at night.  She is getting achiness in her bilateral hips left greater than the right and throbbing in the thighs.  Taking ibuprofen a Pain., Script printed, Disp-40 tablet, R-0      CONTINUE these medications which have NOT CHANGED    Cholecalciferol (VITAMIN D) 1000 UNITS Oral Cap  Take 1 tablet by mouth daily. , Historical    Multiple Vitamin (STRESS FORMULA 500/BIOTIN) Oral Tab  Take

## 2017-11-02 ENCOUNTER — TELEPHONE (OUTPATIENT)
Dept: INTERNAL MEDICINE CLINIC | Facility: CLINIC | Age: 52
End: 2017-11-02

## 2017-11-02 RX ORDER — CYCLOBENZAPRINE HCL 10 MG
TABLET ORAL
Qty: 60 TABLET | Refills: 0 | OUTPATIENT
Start: 2017-11-02

## 2017-11-02 NOTE — TELEPHONE ENCOUNTER
Medication: Cyclobenzaprine     Date of last refill: 10/27/17  [place]    Last office visit: 10/12/2017  Due back to clinic per last office note: None noted   Date next office visit scheduled:  Future Appointments  Da

## 2017-11-02 NOTE — TELEPHONE ENCOUNTER
S/w pt. States she is recovering well. Denies any complications or concerns at this time. Pt has a f/u with surgeon Dr. Pricila Garvin on Tuesday 11/9/17. Advsied she can wait to f/u with us as long as she feels well and has no concerns.  Pt agreeable, will make missy

## 2017-11-02 NOTE — TELEPHONE ENCOUNTER
Patient had surgery about a week ago and has a f/u appointment with Surgeon, however does she still need to follow-up here.   Please advise

## 2017-11-09 ENCOUNTER — OFFICE VISIT (OUTPATIENT)
Dept: SURGERY | Facility: CLINIC | Age: 52
End: 2017-11-09

## 2017-11-09 VITALS
HEIGHT: 66 IN | DIASTOLIC BLOOD PRESSURE: 72 MMHG | SYSTOLIC BLOOD PRESSURE: 120 MMHG | WEIGHT: 140 LBS | BODY MASS INDEX: 22.5 KG/M2 | HEART RATE: 66 BPM

## 2017-11-09 DIAGNOSIS — M54.12 CERVICAL MYELOPATHY WITH CERVICAL RADICULOPATHY (HCC): Primary | ICD-10-CM

## 2017-11-09 DIAGNOSIS — G95.9 CERVICAL MYELOPATHY WITH CERVICAL RADICULOPATHY (HCC): Primary | ICD-10-CM

## 2017-11-09 PROCEDURE — 99024 POSTOP FOLLOW-UP VISIT: CPT | Performed by: PHYSICIAN ASSISTANT

## 2017-11-09 NOTE — PATIENT INSTRUCTIONS
Refill policies:    • Allow 2-3 business days for refills; controlled substances may take longer.   • Contact your pharmacy at least 5 days prior to running out of medication and have them send an electronic request or submit request through the [facility] have a procedure or additional testing performed. [facility]) will contact your insurance carrier to obtain pre-certification or prior authorization.     Unfortunately, JB has seen an increase in denial of payment even though the p

## 2017-11-09 NOTE — PROGRESS NOTES
[facility] Neurosurgery follow-up        HISTORY OF PRESENT Keith Polk is a 46year old RH female here in follow up after C3-7 ACDF, 10/25/17 Dr Kayley Cash. She has minimal neck pain. Swallowing is intact. Voice intact.  She  Is in c collar and using bon here for spinal consultation. She has had back pain on and off for several months. Her back pain generally is about a 4/10. Currently she has no back pain. The back pain was worse when she is lying down at night.   She is getting achiness in her bilater family history includes Alcohol and Other Disorders Associated in her maternal grandfather; Arrhythmia in her father; CHF in her maternal grandmother; Cancer in her father; Diabetes in her maternal grandfather and mother; Hypertension in her paternal grand C3-4 C4-5 retrolisthesis which corrects on flexion.   C5-6, C6-7 spondylosis with anterior spurring and minimal motion        MRI of the brain 4/8/2010 normal age-related changes.     X-rays of cervical spine 10/18/2012 C3-4 spondylosis with retrolisthesis

## 2017-11-27 ENCOUNTER — HOSPITAL ENCOUNTER (OUTPATIENT)
Dept: GENERAL RADIOLOGY | Age: 52
Discharge: HOME | End: 2017-11-27
Attending: PHYSICIAN ASSISTANT
Payer: COMMERCIAL

## 2017-11-27 DIAGNOSIS — G95.9 CERVICAL MYELOPATHY WITH CERVICAL RADICULOPATHY (HCC): ICD-10-CM

## 2017-11-27 DIAGNOSIS — M54.12 CERVICAL MYELOPATHY WITH CERVICAL RADICULOPATHY (HCC): ICD-10-CM

## 2017-11-27 PROCEDURE — 72040 X-RAY EXAM NECK SPINE 2-3 VW: CPT | Performed by: PHYSICIAN ASSISTANT

## 2017-11-28 ENCOUNTER — TELEPHONE (OUTPATIENT)
Dept: SURGERY | Facility: CLINIC | Age: 52
End: 2017-11-28

## 2017-11-28 ENCOUNTER — OFFICE VISIT (OUTPATIENT)
Dept: SURGERY | Facility: CLINIC | Age: 52
End: 2017-11-28

## 2017-11-28 VITALS — RESPIRATION RATE: 18 BRPM | HEART RATE: 100 BPM | SYSTOLIC BLOOD PRESSURE: 108 MMHG | DIASTOLIC BLOOD PRESSURE: 68 MMHG

## 2017-11-28 DIAGNOSIS — G95.9 CERVICAL MYELOPATHY (HCC): ICD-10-CM

## 2017-11-28 DIAGNOSIS — M54.12 CERVICAL MYELOPATHY WITH CERVICAL RADICULOPATHY (HCC): Primary | ICD-10-CM

## 2017-11-28 DIAGNOSIS — G95.9 CERVICAL MYELOPATHY WITH CERVICAL RADICULOPATHY (HCC): Primary | ICD-10-CM

## 2017-11-28 PROCEDURE — 99024 POSTOP FOLLOW-UP VISIT: CPT | Performed by: PHYSICIAN ASSISTANT

## 2017-11-28 RX ORDER — CEPHALEXIN 500 MG/1
500 CAPSULE ORAL 4 TIMES DAILY
Qty: 28 CAPSULE | Refills: 0 | Status: SHIPPED | OUTPATIENT
Start: 2017-11-28 | End: 2017-12-05

## 2017-11-28 NOTE — PATIENT INSTRUCTIONS
Refill policies:    • Allow 2-3 business days for refills; controlled substances may take longer.   • Contact your pharmacy at least 5 days prior to running out of medication and have them send an electronic request or submit request through the [facility] have a procedure or additional testing performed. [facility]) will contact your insurance carrier to obtain pre-certification or prior authorization.     Unfortunately, JB has seen an increase in denial of payment even though the p by Pre-admissions nurse. · Our office will get authorization for surgery. Surgery is usually scheduled as 23 hour admission. · The hospital will contact you 1-2 days before surgery with your arrival time.    · PCP clearance is needed, please contact thei

## 2017-11-28 NOTE — PROGRESS NOTES
[facility] Neurosurgery follow-up        HISTORY OF PRESENT Flo Marcelino is a 46year old RH female here in follow up after C3-7 ACDF, 10/25/17 Dr Rylan Burleson. She is feeling better.  She is getting bilateral forearm, hand and feet numbness and tingling wit here for spinal consultation. She has had back pain on and off for several months. Her back pain generally is about a 4/10. Currently she has no back pain. The back pain was worse when she is lying down at night.   She is getting achiness in her bilater family history includes Alcohol and Other Disorders Associated in her maternal grandfather; Arrhythmia in her father; CHF in her maternal grandmother; Cancer in her father; Diabetes in her maternal grandfather and mother; Hypertension in her paternal grand MRI of the cervical spine 9/22/17. Left C2-3, left C3-4, bilateral C6-7 foraminal stenosis  C3-4 central stenosis, C6-7 central stenosis.   Incidental 2 mm thyroid nodule          X-rays cervical spine 9/21/17 C2-3 spondylolisthesis with change of 3.5 mm b 8. Right ear skin cellulitis      PLAN:  1. Keflex 500mg QID x 7 days  2. C2-3 posterior fusion 12/6/17  3. Off work through 2/6/18  4.  Call PCP if right ear does not improve with Keflex   5. FU 2 weeks postop    Dr Satish Graham evaluated the patient and is in

## 2017-11-28 NOTE — TELEPHONE ENCOUNTER
You are scheduled for Cervical 2-3 posterior fusion on 12/6/17 with .     Pre-op instructions discussed with patient and surgical packet provided:     · You will need to contact the Pre-admission department at [phone] to schedule your pre-op t Pre-admission Testing (PAT) to register at:  [phone].     Please park in the [facility], check in at registration and meet by the fish tank in the [employer] for your escort to class on the Ortho Spine unit.     If unable to attend, class i

## 2017-11-28 NOTE — PROGRESS NOTES
Patient here to review xray and go over treatment plan. Feet numbness comes and goes still. Pain is completely gone. Just yesterday patient noticed right ear was red and had some irritation and drainage.

## 2017-11-29 ENCOUNTER — TELEPHONE (OUTPATIENT)
Dept: INTERNAL MEDICINE CLINIC | Facility: CLINIC | Age: 52
End: 2017-11-29

## 2017-11-30 ENCOUNTER — ANESTHESIA EVENT (OUTPATIENT)
Dept: SURGERY | Facility: HOSPITAL | Age: 52
DRG: 473 | End: 2017-11-30
Payer: COMMERCIAL

## 2017-12-02 ENCOUNTER — LABORATORY ENCOUNTER (OUTPATIENT)
Dept: LAB | Age: 52
End: 2017-12-02
Attending: NEUROLOGICAL SURGERY
Payer: COMMERCIAL

## 2017-12-02 DIAGNOSIS — M54.12 CERVICAL RADICULOPATHY: ICD-10-CM

## 2017-12-02 DIAGNOSIS — G95.9 CERVICAL MYELOPATHY (HCC): ICD-10-CM

## 2017-12-02 PROCEDURE — 85025 COMPLETE CBC W/AUTO DIFF WBC: CPT

## 2017-12-02 PROCEDURE — 87081 CULTURE SCREEN ONLY: CPT

## 2017-12-02 PROCEDURE — 36415 COLL VENOUS BLD VENIPUNCTURE: CPT

## 2017-12-04 ENCOUNTER — TELEPHONE (OUTPATIENT)
Dept: INTERNAL MEDICINE CLINIC | Facility: CLINIC | Age: 52
End: 2017-12-04

## 2017-12-04 ENCOUNTER — OFFICE VISIT (OUTPATIENT)
Dept: INTERNAL MEDICINE CLINIC | Facility: CLINIC | Age: 52
End: 2017-12-04

## 2017-12-04 ENCOUNTER — TELEPHONE (OUTPATIENT)
Dept: SURGERY | Facility: CLINIC | Age: 52
End: 2017-12-04

## 2017-12-04 VITALS
HEART RATE: 81 BPM | HEIGHT: 66 IN | DIASTOLIC BLOOD PRESSURE: 60 MMHG | SYSTOLIC BLOOD PRESSURE: 116 MMHG | WEIGHT: 145 LBS | BODY MASS INDEX: 23.3 KG/M2 | RESPIRATION RATE: 16 BRPM

## 2017-12-04 DIAGNOSIS — G95.9 CERVICAL MYELOPATHY (HCC): Primary | ICD-10-CM

## 2017-12-04 DIAGNOSIS — Z01.818 PRE-OP EVALUATION: ICD-10-CM

## 2017-12-04 PROCEDURE — 99243 OFF/OP CNSLTJ NEW/EST LOW 30: CPT | Performed by: INTERNAL MEDICINE

## 2017-12-04 PROCEDURE — 93000 ELECTROCARDIOGRAM COMPLETE: CPT | Performed by: INTERNAL MEDICINE

## 2017-12-04 NOTE — TELEPHONE ENCOUNTER
Informed patient PA is  still pending as of 12/4/2017, pt states she has PCP appointment today at 3:30 pm

## 2017-12-04 NOTE — PROGRESS NOTES
Patient presents with:  Pre-Op Exam: no concerns,       HPI:  Here for pre op for cervical fusion, pt already had multilevel cervical fusion, pt now completing the needed work.  Pt had no complications from initial fusion, feels well, has no other complaint calculi    • Visual impairment     glasses, contacts     Past Surgical History:  4/26/13: COLONOSCOPY  2015: ERCP,DIAGNOSTIC  No date: FOREARM/WRIST SURGERY UNLISTED      Comment: right wrist fx repair  2015, 2016: FOREARM/WRIST SURGERY UNLISTED Left 05/28/2014   BMI 23.40 kg/m²   Constitutional: Oriented to person, place, and time. No distress. HEENT:  Normocephalic and atraumatic.  Hearing and tympanic membranes normal.  Nose normal. Oropharynx is clear and moist.   Eyes: Conjunctivae and EOM are no

## 2017-12-05 ENCOUNTER — HOSPITAL ENCOUNTER (OUTPATIENT)
Dept: CT IMAGING | Facility: HOSPITAL | Age: 52
Discharge: HOME | End: 2017-12-05
Attending: PHYSICIAN ASSISTANT
Payer: COMMERCIAL

## 2017-12-05 ENCOUNTER — TELEPHONE (OUTPATIENT)
Dept: SURGERY | Facility: CLINIC | Age: 52
End: 2017-12-05

## 2017-12-05 ENCOUNTER — HOSPITAL ENCOUNTER (OUTPATIENT)
Dept: CT IMAGING | Age: 52
End: 2017-12-05
Attending: PHYSICIAN ASSISTANT
Payer: COMMERCIAL

## 2017-12-05 DIAGNOSIS — G95.9 CERVICAL MYELOPATHY WITH CERVICAL RADICULOPATHY (HCC): ICD-10-CM

## 2017-12-05 DIAGNOSIS — G95.9 CERVICAL MYELOPATHY WITH CERVICAL RADICULOPATHY (HCC): Primary | ICD-10-CM

## 2017-12-05 DIAGNOSIS — M54.12 CERVICAL MYELOPATHY WITH CERVICAL RADICULOPATHY (HCC): Primary | ICD-10-CM

## 2017-12-05 DIAGNOSIS — M54.12 CERVICAL MYELOPATHY WITH CERVICAL RADICULOPATHY (HCC): ICD-10-CM

## 2017-12-05 PROCEDURE — 72125 CT NECK SPINE W/O DYE: CPT | Performed by: PHYSICIAN ASSISTANT

## 2017-12-05 NOTE — TELEPHONE ENCOUNTER
Called [facility]. Spoke to [facility]. States that the medical information was received and reviewed on 12/04/17 for cpt codes 18700,34900,63893. Per system notes pre-d was approved but the case has not been closed out at this time.  Once closed and finalized a

## 2017-12-06 ENCOUNTER — SURGERY (OUTPATIENT)
Age: 52
End: 2017-12-06

## 2017-12-06 ENCOUNTER — ANESTHESIA (OUTPATIENT)
Dept: SURGERY | Facility: HOSPITAL | Age: 52
DRG: 473 | End: 2017-12-06
Payer: COMMERCIAL

## 2017-12-06 ENCOUNTER — APPOINTMENT (OUTPATIENT)
Dept: GENERAL RADIOLOGY | Facility: HOSPITAL | Age: 52
DRG: 473 | End: 2017-12-06
Attending: NEUROLOGICAL SURGERY
Payer: COMMERCIAL

## 2017-12-06 ENCOUNTER — HOSPITAL ENCOUNTER (INPATIENT)
Facility: HOSPITAL | Age: 52
LOS: 2 days | Discharge: HOME | DRG: 473 | End: 2017-12-08
Attending: NEUROLOGICAL SURGERY | Admitting: NEUROLOGICAL SURGERY
Payer: COMMERCIAL

## 2017-12-06 DIAGNOSIS — M54.12 CERVICAL RADICULOPATHY: ICD-10-CM

## 2017-12-06 DIAGNOSIS — G95.9 CERVICAL MYELOPATHY (HCC): Primary | ICD-10-CM

## 2017-12-06 DIAGNOSIS — G95.9 CERVICAL MYELOPATHY WITH CERVICAL RADICULOPATHY (HCC): ICD-10-CM

## 2017-12-06 DIAGNOSIS — M54.12 CERVICAL MYELOPATHY WITH CERVICAL RADICULOPATHY (HCC): ICD-10-CM

## 2017-12-06 PROCEDURE — 00NW0ZZ RELEASE CERVICAL SPINAL CORD, OPEN APPROACH: ICD-10-PCS | Performed by: NEUROLOGICAL SURGERY

## 2017-12-06 PROCEDURE — 76001 XR FLUOROSCOPE EXAM >1 HR EXTENSIVE (CPT=76001): CPT | Performed by: NEUROLOGICAL SURGERY

## 2017-12-06 PROCEDURE — 0RG10K1 FUSION OF CERVICAL VERTEBRAL JOINT WITH NONAUTOLOGOUS TISSUE SUBSTITUTE, POSTERIOR APPROACH, POSTERIOR COLUMN, OPEN APPROACH: ICD-10-PCS | Performed by: NEUROLOGICAL SURGERY

## 2017-12-06 DEVICE — GRAFT BN DMNR FBR 15ML: Type: IMPLANTABLE DEVICE | Site: SPINE CERVICAL | Status: FUNCTIONAL

## 2017-12-06 DEVICE — IMPLANTABLE DEVICE: Type: IMPLANTABLE DEVICE | Site: SPINE CERVICAL | Status: FUNCTIONAL

## 2017-12-06 RX ORDER — DOXEPIN HYDROCHLORIDE 50 MG/1
1 CAPSULE ORAL DAILY
Status: DISCONTINUED | OUTPATIENT
Start: 2017-12-06 | End: 2017-12-08

## 2017-12-06 RX ORDER — DOCUSATE SODIUM 100 MG/1
100 CAPSULE, LIQUID FILLED ORAL 2 TIMES DAILY
Status: DISCONTINUED | OUTPATIENT
Start: 2017-12-06 | End: 2017-12-08

## 2017-12-06 RX ORDER — HYDROMORPHONE HYDROCHLORIDE 1 MG/ML
0.8 INJECTION, SOLUTION INTRAMUSCULAR; INTRAVENOUS; SUBCUTANEOUS EVERY 2 HOUR PRN
Status: DISCONTINUED | OUTPATIENT
Start: 2017-12-06 | End: 2017-12-08

## 2017-12-06 RX ORDER — BACITRACIN 50000 [USP'U]/1
INJECTION, POWDER, LYOPHILIZED, FOR SOLUTION INTRAMUSCULAR AS NEEDED
Status: DISCONTINUED | OUTPATIENT
Start: 2017-12-06 | End: 2017-12-06 | Stop reason: HOSPADM

## 2017-12-06 RX ORDER — CYCLOBENZAPRINE HCL 10 MG
10 TABLET ORAL EVERY 8 HOURS PRN
Status: DISCONTINUED | OUTPATIENT
Start: 2017-12-06 | End: 2017-12-08

## 2017-12-06 RX ORDER — SODIUM CHLORIDE AND POTASSIUM CHLORIDE .9; .15 G/100ML; G/100ML
SOLUTION INTRAVENOUS CONTINUOUS
Status: DISCONTINUED | OUTPATIENT
Start: 2017-12-06 | End: 2017-12-08

## 2017-12-06 RX ORDER — BISACODYL 10 MG
10 SUPPOSITORY, RECTAL RECTAL
Status: DISCONTINUED | OUTPATIENT
Start: 2017-12-06 | End: 2017-12-08

## 2017-12-06 RX ORDER — DIAZEPAM 5 MG/1
5 TABLET ORAL EVERY 6 HOURS PRN
Status: DISCONTINUED | OUTPATIENT
Start: 2017-12-06 | End: 2017-12-08

## 2017-12-06 RX ORDER — METOCLOPRAMIDE HYDROCHLORIDE 5 MG/ML
10 INJECTION INTRAMUSCULAR; INTRAVENOUS AS NEEDED
Status: DISCONTINUED | OUTPATIENT
Start: 2017-12-06 | End: 2017-12-06 | Stop reason: HOSPADM

## 2017-12-06 RX ORDER — HYDROMORPHONE HYDROCHLORIDE 1 MG/ML
INJECTION, SOLUTION INTRAMUSCULAR; INTRAVENOUS; SUBCUTANEOUS
Status: COMPLETED
Start: 2017-12-06 | End: 2017-12-06

## 2017-12-06 RX ORDER — HYDROMORPHONE HYDROCHLORIDE 1 MG/ML
0.2 INJECTION, SOLUTION INTRAMUSCULAR; INTRAVENOUS; SUBCUTANEOUS EVERY 2 HOUR PRN
Status: DISCONTINUED | OUTPATIENT
Start: 2017-12-06 | End: 2017-12-08

## 2017-12-06 RX ORDER — HYDROCODONE BITARTRATE AND ACETAMINOPHEN 10; 325 MG/1; MG/1
1 TABLET ORAL AS NEEDED
Status: DISCONTINUED | OUTPATIENT
Start: 2017-12-06 | End: 2017-12-06 | Stop reason: HOSPADM

## 2017-12-06 RX ORDER — CEFAZOLIN SODIUM/WATER 2 G/20 ML
2 SYRINGE (ML) INTRAVENOUS ONCE
Status: DISCONTINUED | OUTPATIENT
Start: 2017-12-06 | End: 2017-12-08

## 2017-12-06 RX ORDER — SODIUM PHOSPHATE, DIBASIC AND SODIUM PHOSPHATE, MONOBASIC 7; 19 G/133ML; G/133ML
1 ENEMA RECTAL ONCE AS NEEDED
Status: DISCONTINUED | OUTPATIENT
Start: 2017-12-06 | End: 2017-12-08

## 2017-12-06 RX ORDER — HYDROCODONE BITARTRATE AND ACETAMINOPHEN 10; 325 MG/1; MG/1
1 TABLET ORAL EVERY 4 HOURS PRN
Status: DISCONTINUED | OUTPATIENT
Start: 2017-12-06 | End: 2017-12-08

## 2017-12-06 RX ORDER — SCOLOPAMINE TRANSDERMAL SYSTEM 1 MG/1
1 PATCH, EXTENDED RELEASE TRANSDERMAL ONCE
Status: DISCONTINUED | OUTPATIENT
Start: 2017-12-06 | End: 2017-12-08

## 2017-12-06 RX ORDER — MEPERIDINE HYDROCHLORIDE 25 MG/ML
12.5 INJECTION INTRAMUSCULAR; INTRAVENOUS; SUBCUTANEOUS AS NEEDED
Status: DISCONTINUED | OUTPATIENT
Start: 2017-12-06 | End: 2017-12-06 | Stop reason: HOSPADM

## 2017-12-06 RX ORDER — HYDROMORPHONE HYDROCHLORIDE 1 MG/ML
0.4 INJECTION, SOLUTION INTRAMUSCULAR; INTRAVENOUS; SUBCUTANEOUS EVERY 5 MIN PRN
Status: DISCONTINUED | OUTPATIENT
Start: 2017-12-06 | End: 2017-12-06 | Stop reason: HOSPADM

## 2017-12-06 RX ORDER — HYDROCODONE BITARTRATE AND ACETAMINOPHEN 10; 325 MG/1; MG/1
2 TABLET ORAL AS NEEDED
Status: DISCONTINUED | OUTPATIENT
Start: 2017-12-06 | End: 2017-12-06 | Stop reason: HOSPADM

## 2017-12-06 RX ORDER — NALOXONE HYDROCHLORIDE 0.4 MG/ML
80 INJECTION, SOLUTION INTRAMUSCULAR; INTRAVENOUS; SUBCUTANEOUS AS NEEDED
Status: DISCONTINUED | OUTPATIENT
Start: 2017-12-06 | End: 2017-12-06 | Stop reason: HOSPADM

## 2017-12-06 RX ORDER — HYDROCODONE BITARTRATE AND ACETAMINOPHEN 10; 325 MG/1; MG/1
2 TABLET ORAL EVERY 4 HOURS PRN
Status: DISCONTINUED | OUTPATIENT
Start: 2017-12-06 | End: 2017-12-08

## 2017-12-06 RX ORDER — POLYETHYLENE GLYCOL 3350 17 G/17G
17 POWDER, FOR SOLUTION ORAL DAILY PRN
Status: DISCONTINUED | OUTPATIENT
Start: 2017-12-06 | End: 2017-12-08

## 2017-12-06 RX ORDER — ACETAMINOPHEN 325 MG/1
650 TABLET ORAL EVERY 4 HOURS PRN
Status: DISCONTINUED | OUTPATIENT
Start: 2017-12-06 | End: 2017-12-08

## 2017-12-06 RX ORDER — ONDANSETRON 2 MG/ML
4 INJECTION INTRAMUSCULAR; INTRAVENOUS EVERY 4 HOURS PRN
Status: DISPENSED | OUTPATIENT
Start: 2017-12-06 | End: 2017-12-07

## 2017-12-06 RX ORDER — SCOLOPAMINE TRANSDERMAL SYSTEM 1 MG/1
PATCH, EXTENDED RELEASE TRANSDERMAL
Status: DISPENSED
Start: 2017-12-06 | End: 2017-12-06

## 2017-12-06 RX ORDER — METOCLOPRAMIDE HYDROCHLORIDE 5 MG/ML
10 INJECTION INTRAMUSCULAR; INTRAVENOUS EVERY 6 HOURS PRN
Status: DISCONTINUED | OUTPATIENT
Start: 2017-12-06 | End: 2017-12-08

## 2017-12-06 RX ORDER — SODIUM CHLORIDE, SODIUM LACTATE, POTASSIUM CHLORIDE, CALCIUM CHLORIDE 600; 310; 30; 20 MG/100ML; MG/100ML; MG/100ML; MG/100ML
INJECTION, SOLUTION INTRAVENOUS CONTINUOUS
Status: DISCONTINUED | OUTPATIENT
Start: 2017-12-06 | End: 2017-12-06

## 2017-12-06 RX ORDER — HYDROMORPHONE HYDROCHLORIDE 1 MG/ML
0.4 INJECTION, SOLUTION INTRAMUSCULAR; INTRAVENOUS; SUBCUTANEOUS EVERY 2 HOUR PRN
Status: DISCONTINUED | OUTPATIENT
Start: 2017-12-06 | End: 2017-12-08

## 2017-12-06 RX ORDER — MIDAZOLAM HYDROCHLORIDE 1 MG/ML
1 INJECTION INTRAMUSCULAR; INTRAVENOUS EVERY 5 MIN PRN
Status: DISCONTINUED | OUTPATIENT
Start: 2017-12-06 | End: 2017-12-06 | Stop reason: HOSPADM

## 2017-12-06 RX ORDER — DIPHENHYDRAMINE HCL 25 MG
25 CAPSULE ORAL EVERY 4 HOURS PRN
Status: DISCONTINUED | OUTPATIENT
Start: 2017-12-06 | End: 2017-12-08

## 2017-12-06 RX ORDER — SODIUM CHLORIDE, SODIUM LACTATE, POTASSIUM CHLORIDE, CALCIUM CHLORIDE 600; 310; 30; 20 MG/100ML; MG/100ML; MG/100ML; MG/100ML
INJECTION, SOLUTION INTRAVENOUS CONTINUOUS
Status: DISCONTINUED | OUTPATIENT
Start: 2017-12-06 | End: 2017-12-06 | Stop reason: HOSPADM

## 2017-12-06 RX ORDER — ONDANSETRON 2 MG/ML
4 INJECTION INTRAMUSCULAR; INTRAVENOUS AS NEEDED
Status: DISCONTINUED | OUTPATIENT
Start: 2017-12-06 | End: 2017-12-06 | Stop reason: HOSPADM

## 2017-12-06 RX ORDER — DIPHENHYDRAMINE HYDROCHLORIDE 50 MG/ML
25 INJECTION INTRAMUSCULAR; INTRAVENOUS EVERY 4 HOURS PRN
Status: DISCONTINUED | OUTPATIENT
Start: 2017-12-06 | End: 2017-12-08

## 2017-12-06 NOTE — OPERATIVE REPORT
[facility]    OPERATIVE REPORT    Patient:  Laura Razo;  YOB: 1965     CSN:  [id number]; Medical Record Number:  [id number]    Admission Date:  12/6/2017 Operation Date:  12/6/2017    . ..........................     Operating Physi tattoo in the skin to the cervical occipital fashion. The C2-C3 and very upper portion of the C4 lamina were exposed bilaterally with subperiosteal dissection.   The lamina of C2 and C3 as well as the surface of the facet joints were drilled for decorticat willem. Osei Dejesus.  Miri Rinaldi, [address] [facility]  Neurological Surgery    Co-Director  [facility]  [address]

## 2017-12-06 NOTE — ANESTHESIA POSTPROCEDURE EVALUATION
[address] Patient Status:  Surgery Admit   Age/Gender 46year old female MRN [id number]   [facility] SURGERY Attending Aram Tucker MD   Hosp Day # 0 PCP Lizbeth Amador MD       Anesthesia Post-op Note    Proce

## 2017-12-06 NOTE — ANESTHESIA PREPROCEDURE EVALUATION
PRE-OP EVALUATION    Patient Name: Sahara Gorman    Pre-op Diagnosis: Cervical myelopathy ([facility] [G95.9]  Cervical myelopathy with cervical radiculopathy [M47.12]    Procedure(s):  POSTERIOR CERVICAL 2-3 FUSION WITH INSTRUMENTATION AND ALLOGRAFT    Surg surgery  No date: OTHER SURGICAL HISTORY      Comment: ovarian cystectomy     Smoking status: Never Smoker    Smokeless tobacco: Never Used    Alcohol use Yes  1.2 oz/week    2 Standard drinks or equivalent per week         Comment: SOCIAL       Drug use:

## 2017-12-06 NOTE — PLAN OF CARE
Dr. Chemo Diaz paged for reglan. Pt tried to eat and warm foods upset stomach. Reglan 10mg q6PRN order received.

## 2017-12-06 NOTE — H&P
HISTORY OF PRESENT ILLNESS:Luci Yung is a 46year [place] 46 female here in follow up after C3-7 ACDF, 10/25/17 Dr Miri Chapman. She is feeling better. She is getting bilateral forearm, hand and feet numbness and tingling with cervical flexion.  This is on here for spinal consultation. Gael Aranda has had back pain on and off for several months. Fredy Roe back pain generally is about a 4/10.  Currently she has no back pain.  The back pain was worse when she is lying down at night.  She is getting achiness in her bilater family history includes Alcohol and Other Disorders Associated in her maternal grandfather; Arrhythmia in her father; CHF in her maternal grandmother; Cancer in her father; Diabetes in her maternal grandfather and mother; Hypertension in her paternal grand MRI of the cervical spine 9/22/17. Left C2-3, left C3-4, bilateral C6-7 foraminal stenosis  C3-4 central stenosis, C6-7 central stenosis.   Incidental 2 mm thyroid nodule            X-rays cervical spine 9/21/17 C2-3 spondylolisthesis with change of 3.5 mm 7.  Left L5-S1 lateral disc herniation   8. Right ear skin cellulitis      PLAN:  1.  Keflex 500mg QID x 7 days  2. C2-3 posterior fusion 12/6/17  3. Off work through 2/6/18  4.  Call PCP if right ear does not improve with Keflex   5. FU 2 weeks postop

## 2017-12-06 NOTE — PLAN OF CARE
Patient/Family Goals    • Patient/Family Long Term Goal Progressing    • Patient/Family Short Term Goal Progressing        S/p C2-3 posterior fusion. Arrived to floor from PACU. Denies pain when asked but drifts off to sleep.  When stirred awake says \"ow\"

## 2017-12-06 NOTE — BRIEF OP NOTE
Pre-Operative Diagnosis: Cervical myelopathy (HCC) [G95.9]  Cervical myelopathy with cervical radiculopathy [M47.12]     Post-Operative Diagnosis: Cervical myelopathy ([address] [G95. 9]Cervical myelopathy with cervical radiculopathy [M47.12]     Procedure Pe

## 2017-12-07 ENCOUNTER — APPOINTMENT (OUTPATIENT)
Dept: GENERAL RADIOLOGY | Facility: HOSPITAL | Age: 52
DRG: 473 | End: 2017-12-07
Attending: PHYSICIAN ASSISTANT
Payer: COMMERCIAL

## 2017-12-07 PROCEDURE — 72040 X-RAY EXAM NECK SPINE 2-3 VW: CPT | Performed by: PHYSICIAN ASSISTANT

## 2017-12-07 PROCEDURE — 99232 SBSQ HOSP IP/OBS MODERATE 35: CPT | Performed by: HOSPITALIST

## 2017-12-07 RX ORDER — DIAZEPAM 5 MG/1
5 TABLET ORAL EVERY 6 HOURS PRN
Status: DISCONTINUED | OUTPATIENT
Start: 2017-12-07 | End: 2017-12-08

## 2017-12-07 NOTE — PROGRESS NOTES
[address] Neurosurgery Progress Note    Rajan Hind Patient Status:  Inpatient    1965 MRN [id number]   [facility] 3SW-A Attending Johnny Mcconnell MD   Hosp Day # 1 PCP Haris Varma MD     Subjective:  Ren Bird [place]  Spectra [phone]  Pager [phone]  12/7/2017, 11:12 AM

## 2017-12-07 NOTE — PHYSICAL THERAPY NOTE
PHYSICAL THERAPY QUICK EVALUATION - INPATIENT    Room Number: 365/365-A  Evaluation Date: 12/7/2017  Presenting Problem: S/p Cervical Posterior C2-3 Subtotal Laminectomy, Posterior C2-3 Fusion with instrumentation on 12/06/17  Physician Order: WEI Anaya an ASSESSMENT  Ratin  Location: Posterior C spine @ surgical site  Management Techniques: Activity promotion; Body mechanics;Breathing techniques;Relaxation;Repositioning   RANGE OF MOTION AND STRENGTH ASSESSMENT  Upper extremity ROM and strength are withi rail assist.Patient was able to recall post surgical precautions independently @ end of session. Patient End of Session: In bed;Needs met;Call light within reach;RN aware of session/findings; All patient questions and concerns addressed;SCDs in place

## 2017-12-07 NOTE — CM/SW NOTE
12/07/17 1300   CM/SW Referral Data   Referral Source Physician   Reason for Referral Discharge planning   Informant Patient   Pertinent Medical Hx   Primary Care Physician Name (Dr. Paulo Rico)   Patient Info   Patient's Mental Status Alert;Oriente

## 2017-12-07 NOTE — OCCUPATIONAL THERAPY NOTE
OCCUPATIONAL THERAPY QUICK EVALUATION - INPATIENT    Room Number: 365/365-A  Evaluation Date: 12/7/2017     Type of Evaluation: Quick Eval  Presenting Problem: s/p C2-C3 posterior subtotal laminectomy, posterior c2-3 fusion w/ instrumentation 12/6/17    Ph the last.    Patient self-stated goal is to go home.     OBJECTIVE  Precautions: Cervical brace (ASPEN collar on @ all times)  Fall Risk: Standard fall risk    WEIGHT BEARING RESTRICTION  Weight Bearing Restriction: None                PAIN ASSESSMENT  Rati questions and concerns addressed;SCDs in place    ASSESSMENT     Patient is a 46year old female admitted on 12/6/2017 for s/p cervical posterior C2-C3 subtotal laminectomy, posterior C2-C3 fusion w/ instrumentation.  Complete medical history and occupation

## 2017-12-07 NOTE — CONSULTS
[facility]  [address] [place] [address] Patient Status:  Inpatient    1965 MRN [id number]   [facility] 3SW-A Attending Antonieta Pro MD   Hosp Day # 0 PCP Aurea Rankin MD     Reason for consult: Medical management prior to encounter. Current Outpatient Prescriptions on File Prior to Encounter:  Cyclobenzaprine HCl 10 MG Oral Tab Take 1 tablet (10 mg total) by mouth 3 (three) times daily as needed for Muscle spasms.  Disp: 60 tablet Rfl: 0   HYDROcodone-acetaminophe hours. No results for input(s): TROP, CK in the last 72 hours. Imaging: Imaging data reviewed in Epic. ASSESSMENT / PLAN:     1. Cervical spine laminectomy with fusion- Neurosurg managing.   2. Nausea- Post-op will order reglan in addition to zof

## 2017-12-08 VITALS
OXYGEN SATURATION: 92 % | SYSTOLIC BLOOD PRESSURE: 103 MMHG | BODY MASS INDEX: 22.36 KG/M2 | DIASTOLIC BLOOD PRESSURE: 51 MMHG | HEART RATE: 89 BPM | TEMPERATURE: 99 F | HEIGHT: 66 IN | RESPIRATION RATE: 14 BRPM | WEIGHT: 139.13 LBS

## 2017-12-08 PROBLEM — R11.0 NAUSEA AFTER ANESTHESIA: Status: ACTIVE | Noted: 2017-12-08

## 2017-12-08 PROBLEM — T88.59XA NAUSEA AFTER ANESTHESIA: Status: ACTIVE | Noted: 2017-12-08

## 2017-12-08 PROCEDURE — 99232 SBSQ HOSP IP/OBS MODERATE 35: CPT | Performed by: HOSPITALIST

## 2017-12-08 RX ORDER — HYDROCODONE BITARTRATE AND ACETAMINOPHEN 10; 325 MG/1; MG/1
1-2 TABLET ORAL EVERY 6 HOURS PRN
Qty: 60 TABLET | Refills: 0 | Status: SHIPPED | OUTPATIENT
Start: 2017-12-08 | End: 2017-12-14

## 2017-12-08 RX ORDER — ONDANSETRON 4 MG/1
4 TABLET, ORALLY DISINTEGRATING ORAL EVERY 4 HOURS PRN
Qty: 10 TABLET | Refills: 1 | Status: SHIPPED | OUTPATIENT
Start: 2017-12-08 | End: 2018-01-05

## 2017-12-08 RX ORDER — DIAZEPAM 5 MG/1
5 TABLET ORAL EVERY 6 HOURS PRN
Qty: 40 TABLET | Refills: 0 | Status: SHIPPED | OUTPATIENT
Start: 2017-12-08 | End: 2017-12-14

## 2017-12-08 NOTE — PROGRESS NOTES
[facility]  Progress Note     Pat Orellana Patient Status:  Inpatient    1965 MRN [id number]   [facility] 3SW-A Attending Christopher Cohn MD   Hosp Day # 2 PCP Oscar Davidson MD     Chief Complaint: Medical management and zofran. I spoke with neurosurgery APN about using valium for muscle relaxant ans well as having some antiemetic effect.     Plan of care: As above    Quality:  · DVT Prophylaxis: SCD's  · CODE status: Full  · Griffith: N/A  · Central line: N/A    Estimated

## 2017-12-08 NOTE — PROGRESS NOTES
[facility]  Progress Note     Marilyn Purple Patient Status:  Inpatient    1965 MRN [id number]   [facility] 3SW-A Attending Garcia Vance MD   Hosp Day # 2 PCP Angel Horan MD     Chief Complaint: Medical management spoke with neurosurgery APN about using valium for muscle relaxant ans well as having some antiemetic effect. Plan of care: As above. OK for discharge from a medicine perspective.     Quality:  · DVT Prophylaxis: SCD's  · CODE status: Full  · Griffith: N/A

## 2017-12-08 NOTE — PLAN OF CARE
Rates pain at # 6/10. Pain mostly back of neck , aspen collar on and realign , dressing dry/intact . Both upper arms supported by pillows for comfort . Offered snacks for tonight prior to oral pain meds , slight nausea - Reglan IV given .  Voiding . , reinf

## 2017-12-08 NOTE — PROGRESS NOTES
Pt being discharged home with . Scripts filled for valium, zofran, and norco and filled at our pharmacy and given to pt. Pt has all belongings.

## 2017-12-08 NOTE — PROGRESS NOTES
[address] Neurosurgery Progress Note    Markie Guajardo Patient Status:  Inpatient    1965 MRN [id number]   [facility] 3SW-A Attending Ryan Erazo MD   Hosp Day # 2 PCP Miguel Angel Hernandez MD     Subjective:  Anitra Van wound care instructions provided.       ANNI Brown  SendMe [phone]  Pager [phone]  12/8/2017, 10:26 AM

## 2017-12-11 ENCOUNTER — PATIENT OUTREACH (OUTPATIENT)
Dept: CASE MANAGEMENT | Age: 52
End: 2017-12-11

## 2017-12-11 DIAGNOSIS — Z02.9 ENCOUNTERS FOR ADMINISTRATIVE PURPOSE: ICD-10-CM

## 2017-12-11 NOTE — CM/SW NOTE
12/11/17 0800   Discharge disposition   Discharged to: Home or Self   Discharge transportation Private car

## 2017-12-11 NOTE — DISCHARGE SUMMARY
[facility]  Discharge Summary    Hiral Walters Patient Status:  Inpatient    1965 MRN [id number]   [facility] 3SW-A Attending No att. providers found   Hosp Day # 2 PCP Dharmesh Leal MD     Date of Admission: 2017    Da main tylenol and occasional norco.  She gets arm arm shooting pain when getting up from a chair.     Last hx  She has seen Dr. Mary Beverly and is set up for cervical epidural steroid injections.     State her left arm pain has improved it is a 2/10.  Physical the  Denies any bowel bladder incontinence but she does get occasional urinary leakage.  And some urgency.  She trips on occasion.     She denies any night sweats unexplained weight loss.       Procedures: Procedure(s):  CERVICAL POSTERIOR 2-3 SUBTOTAL LAMINECT Instructions:   Activity: activity as tolerated, no lifting, Driving, or Strenuous exercise for 2 weeks and no driving while on analgesics  Wound Care: keep wound clean and dry and as directed     May remove dressing on Friday and keep open to air  May show

## 2017-12-12 ENCOUNTER — TELEPHONE (OUTPATIENT)
Dept: SURGERY | Facility: CLINIC | Age: 52
End: 2017-12-12

## 2017-12-12 NOTE — TELEPHONE ENCOUNTER
Pt wants to know if she really needs to be seen 12/14 since that's only 1 week post op or if she can push this appt out 1 wk; Robel Multani moved the appt from 12/21 to 12/14, to pt is unsure, please advise

## 2017-12-12 NOTE — PROGRESS NOTES
Initial Post Discharge Follow Up   Discharge Date: 12/8/17  Contact Date: 12/12/2017    Consent Verification:  Assessment Completed With: Patient  HIPAA Verified?   Yes    Discharge Dx:   Cervical myelopathy, s/p CERVICAL POSTERIOR 2-3 SUBTOTAL LAMINECTO

## 2017-12-12 NOTE — TELEPHONE ENCOUNTER
Discussed with Hermes Deluca- he wanted to see patient to make sure she was doing due to painful recovery from posterior approach. Spoke with patient, she states she is doing okay but taking a lot of Norco which is making her very drowsy.  She is only julio

## 2017-12-14 ENCOUNTER — OFFICE VISIT (OUTPATIENT)
Dept: SURGERY | Facility: CLINIC | Age: 52
End: 2017-12-14

## 2017-12-14 VITALS — TEMPERATURE: 98 F | DIASTOLIC BLOOD PRESSURE: 80 MMHG | SYSTOLIC BLOOD PRESSURE: 118 MMHG | HEART RATE: 100 BPM

## 2017-12-14 DIAGNOSIS — M54.12 CERVICAL MYELOPATHY WITH CERVICAL RADICULOPATHY (HCC): Primary | ICD-10-CM

## 2017-12-14 DIAGNOSIS — M54.12 CERVICAL RADICULOPATHY: ICD-10-CM

## 2017-12-14 DIAGNOSIS — G95.9 CERVICAL MYELOPATHY WITH CERVICAL RADICULOPATHY (HCC): Primary | ICD-10-CM

## 2017-12-14 DIAGNOSIS — G95.9 CERVICAL MYELOPATHY (HCC): ICD-10-CM

## 2017-12-14 PROCEDURE — 99024 POSTOP FOLLOW-UP VISIT: CPT | Performed by: PHYSICIAN ASSISTANT

## 2017-12-14 RX ORDER — METHYLPREDNISOLONE 4 MG/1
TABLET ORAL
Qty: 1 PACKAGE | Refills: 0 | Status: SHIPPED | OUTPATIENT
Start: 2017-12-14 | End: 2017-12-22

## 2017-12-14 RX ORDER — DIAZEPAM 5 MG/1
5 TABLET ORAL EVERY 6 HOURS PRN
Qty: 60 TABLET | Refills: 0 | Status: SHIPPED | OUTPATIENT
Start: 2017-12-14 | End: 2018-01-05

## 2017-12-14 RX ORDER — HYDROCODONE BITARTRATE AND ACETAMINOPHEN 10; 325 MG/1; MG/1
1 TABLET ORAL EVERY 4 HOURS PRN
Qty: 90 TABLET | Refills: 0 | Status: SHIPPED | OUTPATIENT
Start: 2017-12-14 | End: 2018-01-05

## 2017-12-14 NOTE — PATIENT INSTRUCTIONS
Refill policies:    • Allow 2-3 business days for refills; controlled substances may take longer.   • Contact your pharmacy at least 5 days prior to running out of medication and have them send an electronic request or submit request through the [facility] have a procedure or additional testing performed. [facility]) will contact your insurance carrier to obtain pre-certification or prior authorization.     Unfortunately, JB has seen an increase in denial of payment even though the p

## 2017-12-14 NOTE — PROGRESS NOTES
Pt is here to follow up after sx pain currently 2/10  Worse in the past 2 days, nauseated lately pain reaching 7/10  Denies fever, chills,

## 2017-12-14 NOTE — PROGRESS NOTES
JB Neurosurgery follow-up        HISTORY OF PRESENT Shahid Espino is a 46year old RH female here in follow up. She denies any numbness or tingling in the arms hands legs or feet currently.   She gets intermittent numbness and tingling in the State her left arm pain has improved it is a 2/10. Physical therapy and traction have helped. She states her neck pain now is gotten worse is a 2-8/10. She is having trouble with walking and tripping.   She has increased numbness constantly now in her le PAST MEDICAL HISTORY:       Past Medical History:   Diagnosis Date   • Chest pain, unspecified     • Disturbance of skin sensation     • Extrinsic asthma, unspecified     • Nontoxic uninodular goiter     • Personal history of neurosis     • Personal histor SPINE: Sensation to light touch is intact bilateral in both arms and legs. Gait intact. Cervical incision healed. Suture removed from lateral corner.   Upper extremity strength:        Deltoid    Triceps     Biceps        Wrist Extension  Finger exte MRI of the lumbar spine from 3/13/15 shows spondylosis at L4-5 L5-S1. Atypical spondylosis at L1-2. Right annular tear at 7 to 8 o'clock position. Bilateral facet edema of L4-5 and L5-S1.   Minimal bulging at L4-5 and L5-S1 giving rise to minimal foramin

## 2017-12-18 ENCOUNTER — LAB ENCOUNTER (OUTPATIENT)
Dept: LAB | Age: 52
End: 2017-12-18
Attending: NURSE PRACTITIONER
Payer: COMMERCIAL

## 2017-12-18 ENCOUNTER — OFFICE VISIT (OUTPATIENT)
Dept: INTERNAL MEDICINE CLINIC | Facility: CLINIC | Age: 52
End: 2017-12-18

## 2017-12-18 VITALS
HEART RATE: 88 BPM | DIASTOLIC BLOOD PRESSURE: 80 MMHG | WEIGHT: 142 LBS | SYSTOLIC BLOOD PRESSURE: 122 MMHG | BODY MASS INDEX: 22.82 KG/M2 | TEMPERATURE: 98 F | HEIGHT: 66 IN

## 2017-12-18 DIAGNOSIS — R11.0 NAUSEA AFTER ANESTHESIA, INITIAL ENCOUNTER: ICD-10-CM

## 2017-12-18 DIAGNOSIS — D64.9 POSTOPERATIVE ANEMIA: ICD-10-CM

## 2017-12-18 DIAGNOSIS — T88.59XA NAUSEA AFTER ANESTHESIA, INITIAL ENCOUNTER: ICD-10-CM

## 2017-12-18 DIAGNOSIS — Z98.890 S/P LAMINECTOMY: Primary | ICD-10-CM

## 2017-12-18 DIAGNOSIS — G95.9 CERVICAL MYELOPATHY (HCC): ICD-10-CM

## 2017-12-18 PROCEDURE — 85025 COMPLETE CBC W/AUTO DIFF WBC: CPT

## 2017-12-18 PROCEDURE — 99495 TRANSJ CARE MGMT MOD F2F 14D: CPT | Performed by: NURSE PRACTITIONER

## 2017-12-18 NOTE — PROGRESS NOTES
HPI:    Shiloh Wang is a 46year old female here today for hospital follow up.    She was discharged from Inpatient hospital, [facility] to Home   Admission Date: 12/6/17   Discharge Date: 12/8/17  Hospital Discharge Diagnosis: s/p cervical fus Allergies:  She is allergic to adhesive tape.     Current Meds:    Current Outpatient Prescriptions on File Prior to Visit:  HYDROcodone-acetaminophen  MG Oral Tab Take 1 tablet by mouth every 4 (four) hours as needed for Pain.   diazepam 5 MG Ora denies shortness of breath with exertion  CARDIOVASCULAR: as above  GI: as above  MUSCULOSKELETAL: denies pain, normal range of motion of extremities  NEURO: as above    PHYSICAL EXAM:   Patient's last menstrual period was 05/28/2014.   Estimated body mass Transitional Care Management Certification:  I certify that the following are true:  Communication with the patient was made within 2 business days of discharge on date above   Medical Decision Making- Based on service period of discharge to 30 days:

## 2017-12-18 NOTE — PROGRESS NOTES
Multiple attempts to reach the pt and messages left with no returned phone call. Pt went to [place] on 12/18/17. Closing encounter.

## 2017-12-22 ENCOUNTER — OFFICE VISIT (OUTPATIENT)
Dept: FAMILY MEDICINE CLINIC | Facility: CLINIC | Age: 52
End: 2017-12-22

## 2017-12-22 VITALS
DIASTOLIC BLOOD PRESSURE: 86 MMHG | HEIGHT: 66 IN | HEART RATE: 93 BPM | RESPIRATION RATE: 20 BRPM | BODY MASS INDEX: 21.69 KG/M2 | SYSTOLIC BLOOD PRESSURE: 122 MMHG | WEIGHT: 135 LBS | TEMPERATURE: 98 F | OXYGEN SATURATION: 98 %

## 2017-12-22 DIAGNOSIS — J00 ACUTE NASOPHARYNGITIS: ICD-10-CM

## 2017-12-22 DIAGNOSIS — K52.9 GASTROENTERITIS: Primary | ICD-10-CM

## 2017-12-22 PROCEDURE — 99213 OFFICE O/P EST LOW 20 MIN: CPT | Performed by: NURSE PRACTITIONER

## 2017-12-22 NOTE — PROGRESS NOTES
HPI:   Ivana Hill is a 46year old female who presents with ill symptoms for  3  days.  Patient recently seen by PCP office for follow up after laminectomy with no complaints; notes that two days ago began feeling \"punky\", had a severe onset of vom UNLISTED      Comment: right wrist fx repair  2015, 2016: FOREARM/WRIST SURGERY UNLISTED Left      Comment: left wrist ganglion cyst excision   10/2017 and 12/2017: OTHER      Comment: neck surgery  12/06/2017: OTHER      Comment: POSTERIOR CERVICAL FUSION nasopharyngitis      Advised on gastro diet. Self care discussed. Medication use and risk/benefit discussed. To follow closely with PCP and neuro if not improving.   Patient Instructions   Take small amounts of clear fluids frequently, sips every 15 minutes

## 2017-12-22 NOTE — PATIENT INSTRUCTIONS
Take small amounts of clear fluids frequently, sips every 15 minutes as tolerated  Advance diet slowly as tolerated, start with small amounts of bland foods  Follow BRAT diet till symptoms resolved, usually in 2-3 days, which includes bananas, rice, apples

## 2018-01-04 ENCOUNTER — HOSPITAL ENCOUNTER (OUTPATIENT)
Dept: GENERAL RADIOLOGY | Age: 53
Discharge: HOME | End: 2018-01-04
Attending: PHYSICIAN ASSISTANT
Payer: COMMERCIAL

## 2018-01-04 DIAGNOSIS — G95.9 CERVICAL MYELOPATHY (HCC): ICD-10-CM

## 2018-01-04 DIAGNOSIS — M54.12 CERVICAL RADICULOPATHY: ICD-10-CM

## 2018-01-04 DIAGNOSIS — M54.12 CERVICAL MYELOPATHY WITH CERVICAL RADICULOPATHY (HCC): ICD-10-CM

## 2018-01-04 DIAGNOSIS — G95.9 CERVICAL MYELOPATHY WITH CERVICAL RADICULOPATHY (HCC): ICD-10-CM

## 2018-01-04 PROCEDURE — 72040 X-RAY EXAM NECK SPINE 2-3 VW: CPT | Performed by: PHYSICIAN ASSISTANT

## 2018-01-05 ENCOUNTER — OFFICE VISIT (OUTPATIENT)
Dept: SURGERY | Facility: CLINIC | Age: 53
End: 2018-01-05

## 2018-01-05 VITALS — HEART RATE: 92 BPM | RESPIRATION RATE: 18 BRPM | DIASTOLIC BLOOD PRESSURE: 80 MMHG | SYSTOLIC BLOOD PRESSURE: 118 MMHG

## 2018-01-05 DIAGNOSIS — M54.12 CERVICAL MYELOPATHY WITH CERVICAL RADICULOPATHY (HCC): Primary | ICD-10-CM

## 2018-01-05 DIAGNOSIS — M54.12 CERVICAL RADICULOPATHY: ICD-10-CM

## 2018-01-05 DIAGNOSIS — G95.9 CERVICAL MYELOPATHY WITH CERVICAL RADICULOPATHY (HCC): Primary | ICD-10-CM

## 2018-01-05 PROCEDURE — 99024 POSTOP FOLLOW-UP VISIT: CPT | Performed by: PHYSICIAN ASSISTANT

## 2018-01-05 RX ORDER — ACETAMINOPHEN 500 MG
500 TABLET ORAL AS NEEDED
COMMUNITY
End: 2018-11-05 | Stop reason: ALTCHOICE

## 2018-01-05 NOTE — PROGRESS NOTES
JB Neurosurgery follow-up        HISTORY OF PRESENT Margi Regan is a 46year old RH female here in follow up. Since her last visit she did have the flu and had a number of episodes of emesis. She had quite a tough week during that time.   Kobe Sargent She has minimal neck pain. Swallowing is intact. Voice intact. She  Is in c collar and using bone stim. When she tries to shave her legs she gets bilateral leg tingling. She denies fevers, bowel or bladder changes. SHe in not tripping.  Sensation improved i here for spinal consultation. She has had back pain on and off for several months. Her back pain generally is about a 4/10. Currently she has no back pain. The back pain was worse when she is lying down at night.   She is getting achiness in her bilater family history includes Alcohol and Other Disorders Associated in her maternal grandfather; Arrhythmia in her father; CHF in her maternal grandmother; Cancer in her father; Diabetes in her maternal grandfather and mother; Hypertension in her paternal grand MRI of the cervical spine 9/22/17. Left C2-3, left C3-4, bilateral C6-7 foraminal stenosis  C3-4 central stenosis, C6-7 central stenosis.   Incidental 2 mm thyroid nodule          X-rays cervical spine 9/21/17 C2-3 spondylolisthesis with change of 3.5 mm b 1.    Continue in cervical collar, she will try a roll collar which was slightly taller keep her neck in neutral position  2. Limited flexion-extension she can continue with removing the collar at night and for meals and showering  3.   Recommend trial of

## 2018-01-05 NOTE — PATIENT INSTRUCTIONS
Refill policies:    • Allow 2-3 business days for refills; controlled substances may take longer.   • Contact your pharmacy at least 5 days prior to running out of medication and have them send an electronic request or submit request through the [facility] have a procedure or additional testing performed. [facility]) will contact your insurance carrier to obtain pre-certification or prior authorization.     Unfortunately, JB has seen an increase in denial of payment even though the p

## 2018-01-05 NOTE — PROGRESS NOTES
Patient following up with new cervical xrays. Patient starting to feel better. Getting over the flu. Still having right foot numbness.

## 2018-01-09 ENCOUNTER — TELEPHONE (OUTPATIENT)
Dept: SURGERY | Facility: CLINIC | Age: 53
End: 2018-01-09

## 2018-01-09 ENCOUNTER — HOSPITAL ENCOUNTER (OUTPATIENT)
Dept: CT IMAGING | Age: 53
Discharge: HOME | End: 2018-01-09
Attending: PHYSICIAN ASSISTANT
Payer: COMMERCIAL

## 2018-01-09 DIAGNOSIS — M54.12 CERVICAL MYELOPATHY WITH CERVICAL RADICULOPATHY (HCC): ICD-10-CM

## 2018-01-09 DIAGNOSIS — G95.9 CERVICAL MYELOPATHY WITH CERVICAL RADICULOPATHY (HCC): ICD-10-CM

## 2018-01-09 DIAGNOSIS — G95.9 CERVICAL MYELOPATHY WITH CERVICAL RADICULOPATHY (HCC): Primary | ICD-10-CM

## 2018-01-09 DIAGNOSIS — Z98.1 S/P CERVICAL SPINAL FUSION: ICD-10-CM

## 2018-01-09 DIAGNOSIS — M54.12 CERVICAL MYELOPATHY WITH CERVICAL RADICULOPATHY (HCC): Primary | ICD-10-CM

## 2018-01-09 PROCEDURE — 72125 CT NECK SPINE W/O DYE: CPT | Performed by: PHYSICIAN ASSISTANT

## 2018-01-09 NOTE — TELEPHONE ENCOUNTER
CT calling, asking if patient can remove collar for scan today. Informed her per last OV notes, she can remove collar for sleeping, meals and showers and should have limited flex/extension. She may remove collar for imaging.

## 2018-01-09 NOTE — TELEPHONE ENCOUNTER
Radiology called to provide STAT results on CT. Per CT and CT report: \"CONCLUSION:    1. No acute osseous injuries. 2. Hardware fusion of the cervical spine, posteriorly at C2-3 in anteriorly with interbody fusion from C3-C7.   No evidence of hardware f

## 2018-02-01 ENCOUNTER — HOSPITAL ENCOUNTER (OUTPATIENT)
Dept: GENERAL RADIOLOGY | Facility: HOSPITAL | Age: 53
Discharge: HOME | End: 2018-02-01
Attending: PHYSICIAN ASSISTANT
Payer: COMMERCIAL

## 2018-02-01 DIAGNOSIS — G95.9 CERVICAL MYELOPATHY WITH CERVICAL RADICULOPATHY (HCC): ICD-10-CM

## 2018-02-01 DIAGNOSIS — M54.12 CERVICAL RADICULOPATHY: ICD-10-CM

## 2018-02-01 DIAGNOSIS — M54.12 CERVICAL MYELOPATHY WITH CERVICAL RADICULOPATHY (HCC): ICD-10-CM

## 2018-02-01 PROCEDURE — 72040 X-RAY EXAM NECK SPINE 2-3 VW: CPT | Performed by: PHYSICIAN ASSISTANT

## 2018-02-02 ENCOUNTER — OFFICE VISIT (OUTPATIENT)
Dept: SURGERY | Facility: CLINIC | Age: 53
End: 2018-02-02

## 2018-02-02 ENCOUNTER — TELEPHONE (OUTPATIENT)
Dept: INTERNAL MEDICINE CLINIC | Facility: CLINIC | Age: 53
End: 2018-02-02

## 2018-02-02 VITALS — SYSTOLIC BLOOD PRESSURE: 118 MMHG | HEART RATE: 80 BPM | DIASTOLIC BLOOD PRESSURE: 70 MMHG

## 2018-02-02 DIAGNOSIS — G95.9 CERVICAL MYELOPATHY WITH CERVICAL RADICULOPATHY (HCC): Primary | ICD-10-CM

## 2018-02-02 DIAGNOSIS — M54.12 CERVICAL MYELOPATHY WITH CERVICAL RADICULOPATHY (HCC): Primary | ICD-10-CM

## 2018-02-02 DIAGNOSIS — G95.9 CERVICAL MYELOPATHY (HCC): ICD-10-CM

## 2018-02-02 DIAGNOSIS — Z98.1 S/P CERVICAL SPINAL FUSION: ICD-10-CM

## 2018-02-02 PROCEDURE — 99024 POSTOP FOLLOW-UP VISIT: CPT | Performed by: PHYSICIAN ASSISTANT

## 2018-02-02 RX ORDER — DIAZEPAM 5 MG/1
1 TABLET ORAL AS NEEDED
Refills: 0 | COMMUNITY
Start: 2018-01-09 | End: 2018-11-05 | Stop reason: ALTCHOICE

## 2018-02-02 NOTE — PATIENT INSTRUCTIONS
Refill policies:    • Allow 2-3 business days for refills; controlled substances may take longer.   • Contact your pharmacy at least 5 days prior to running out of medication and have them send an electronic request or submit request through the [facility] recommended that you have a procedure or additional testing performed. [facility]) will contact your insurance carrier to obtain pre-certification or prior authorization.     Unfortunately, JB has seen an increase in denial of paym

## 2018-02-02 NOTE — PROGRESS NOTES
JB Neurosurgery follow-up        HISTORY OF PRESENT Maddison Bush is a 46year old RH female here in follow up. Doing very well after returning to work 8 hours a day 5 days a week. States her neck is sore but she denies any neck pain.   She d She denies any numbness or tingling in the arms hands legs or feet currently. She gets intermittent numbness and tingling in the right toes on and off still. Her strength is improved and her legs and arms.   She is having upper cervical and occipital pain State her left arm pain has improved it is a 2/10. Physical therapy and traction have helped. She states her neck pain now is gotten worse is a 2-8/10. She is having trouble with walking and tripping.   She has increased numbness constantly now in her le PAST MEDICAL HISTORY:       Past Medical History:   Diagnosis Date   • Chest pain, unspecified     • Disturbance of skin sensation     • Extrinsic asthma, unspecified     • Nontoxic uninodular goiter     • Personal history of neurosis     • Personal histor Right         5        5         5        5 5 5- 4+ 5 5-   Left         5        5         5         5 5 4+ 4+ 5 5      Lower extremity strength:      Iliopsoas  Hamstrings   Quads    D-flexion P-flexion Eversion   Right       5         5       5         5 MRI of the lumbar spine from 3/13/15 shows spondylosis at L4-5 L5-S1. Atypical spondylosis at L1-2. Right annular tear at 7 to 8 o'clock position. Bilateral facet edema of L4-5 and L5-S1.   Minimal bulging at L4-5 and L5-S1 giving rise to minimal foramin

## 2018-02-16 ENCOUNTER — HOSPITAL ENCOUNTER (OUTPATIENT)
Dept: GENERAL RADIOLOGY | Facility: HOSPITAL | Age: 53
Discharge: HOME | End: 2018-02-16
Attending: PHYSICIAN ASSISTANT
Payer: COMMERCIAL

## 2018-02-16 ENCOUNTER — OFFICE VISIT (OUTPATIENT)
Dept: SURGERY | Facility: CLINIC | Age: 53
End: 2018-02-16

## 2018-02-16 VITALS — HEART RATE: 88 BPM | DIASTOLIC BLOOD PRESSURE: 74 MMHG | SYSTOLIC BLOOD PRESSURE: 132 MMHG

## 2018-02-16 DIAGNOSIS — S46.912A LEFT SHOULDER STRAIN, INITIAL ENCOUNTER: ICD-10-CM

## 2018-02-16 DIAGNOSIS — S16.1XXS STRAIN OF NECK MUSCLE, SEQUELA: ICD-10-CM

## 2018-02-16 DIAGNOSIS — M25.552 ACUTE HIP PAIN, LEFT: ICD-10-CM

## 2018-02-16 DIAGNOSIS — Z98.1 S/P CERVICAL SPINAL FUSION: ICD-10-CM

## 2018-02-16 DIAGNOSIS — Z98.1 S/P CERVICAL SPINAL FUSION: Primary | ICD-10-CM

## 2018-02-16 PROCEDURE — 73502 X-RAY EXAM HIP UNI 2-3 VIEWS: CPT | Performed by: PHYSICIAN ASSISTANT

## 2018-02-16 PROCEDURE — 73030 X-RAY EXAM OF SHOULDER: CPT | Performed by: PHYSICIAN ASSISTANT

## 2018-02-16 PROCEDURE — 72040 X-RAY EXAM NECK SPINE 2-3 VW: CPT | Performed by: PHYSICIAN ASSISTANT

## 2018-02-16 PROCEDURE — 99213 OFFICE O/P EST LOW 20 MIN: CPT | Performed by: PHYSICIAN ASSISTANT

## 2018-02-16 NOTE — PROGRESS NOTES
JB Neurosurgery follow-up        HISTORY OF PRESENT Adrian Pro is a 46year old RH female here in follow up. Comes in today for reevaluation.   She states this morning while she was walking out of her condo she slipped on the ice both feet Since her last visit she did have the flu and had a number of episodes of emesis. She had quite a tough week during that time. She states her neck pain is improved it it is a 1 to a 4/10. Her occipital headaches are coming and going.   She denies any arm She has minimal neck pain. Swallowing is intact. Voice intact. She  Is in c collar and using bone stim. When she tries to shave her legs she gets bilateral leg tingling. She denies fevers, bowel or bladder changes. SHe in not tripping.  Sensation improved i here for spinal consultation. She has had back pain on and off for several months. Her back pain generally is about a 4/10. Currently she has no back pain. The back pain was worse when she is lying down at night.   She is getting achiness in her bilater family history includes Alcohol and Other Disorders Associated in her maternal grandfather; Arrhythmia in her father; CHF in her maternal grandmother; Cancer in her father; Diabetes in her maternal grandfather and mother; Hypertension in her paternal grand Right         5        5         5        5 5 5- 4+ 5 5-   Left         5        5         5         5 5 4+ 4+ 5 5      Lower extremity strength: Strength is preserved     Iliopsoas  Hamstrings   Quads    D-flexion P-flexion Eversion   Right       5 MRI of the lumbar spine from 3/13/15 shows spondylosis at L4-5 L5-S1. Atypical spondylosis at L1-2. Right annular tear at 7 to 8 o'clock position. Bilateral facet edema of L4-5 and L5-S1.   Minimal bulging at L4-5 and L5-S1 giving rise to minimal foramin

## 2018-02-16 NOTE — PATIENT INSTRUCTIONS
PLAN:  1.    AP and lateral x-ray of the cervical spine, left shoulder x-ray left hip x-ray  2. She can try heat and ice continue with NSAIDs. 3.  Call with any changes.

## 2018-03-13 ENCOUNTER — OFFICE VISIT (OUTPATIENT)
Dept: SURGERY | Facility: CLINIC | Age: 53
End: 2018-03-13

## 2018-03-13 ENCOUNTER — TELEPHONE (OUTPATIENT)
Dept: SURGERY | Facility: CLINIC | Age: 53
End: 2018-03-13

## 2018-03-13 VITALS — HEART RATE: 78 BPM | DIASTOLIC BLOOD PRESSURE: 70 MMHG | SYSTOLIC BLOOD PRESSURE: 102 MMHG

## 2018-03-13 DIAGNOSIS — IMO0001 WOUND ABSCESS, INITIAL ENCOUNTER: Primary | ICD-10-CM

## 2018-03-13 DIAGNOSIS — Z98.1 S/P CERVICAL SPINAL FUSION: ICD-10-CM

## 2018-03-13 PROCEDURE — 99213 OFFICE O/P EST LOW 20 MIN: CPT | Performed by: PHYSICIAN ASSISTANT

## 2018-03-13 RX ORDER — CEPHALEXIN 500 MG/1
500 CAPSULE ORAL 4 TIMES DAILY
Qty: 28 CAPSULE | Refills: 0 | Status: SHIPPED | OUTPATIENT
Start: 2018-03-13 | End: 2018-03-20

## 2018-03-13 NOTE — PATIENT INSTRUCTIONS
Refill policies:    • Allow 2-3 business days for refills; controlled substances may take longer.   • Contact your pharmacy at least 5 days prior to running out of medication and have them send an electronic request or submit request through the [facility] for the entire amount billed. Precertification and Prior Authorizations  If your physician has recommended that you have a procedure or additional testing performed.   [employer] (JB) will contact your insurance carrier to obtain pr

## 2018-03-13 NOTE — PROGRESS NOTES
JB Neurosurgery follow-up        HISTORY OF PRESENT Ramonita Burns is a 46year old RH female here in follow up. She is feeling better. Her left hip and knee pain took a week to recover. She gets occasional left shoulder pain.      Last Friday s Since her last visit she did have the flu and had a number of episodes of emesis. She had quite a tough week during that time. She states her neck pain is improved it it is a 1 to a 4/10. Her occipital headaches are coming and going.   She denies any arm She has minimal neck pain. Swallowing is intact. Voice intact. She  Is in c collar and using bone stim. When she tries to shave her legs she gets bilateral leg tingling. She denies fevers, bowel or bladder changes. SHe in not tripping.  Sensation improved i here for spinal consultation. She has had back pain on and off for several months. Her back pain generally is about a 4/10. Currently she has no back pain. The back pain was worse when she is lying down at night.   She is getting achiness in her bilater family history includes Alcohol and Other Disorders Associated in her maternal grandfather; Arrhythmia in her father; CHF in her maternal grandmother; Cancer in her father; Diabetes in her maternal grandfather and mother; Hypertension in her paternal grand      Deltoid    Triceps     Biceps        Wrist Extension  Finger extension Thumb Abduction  Thumb adduction Intrinsics   Right         5        5         5        5 5 5- 4+ 5 5-   Left         5        5         5         5 5 4+ 4+ 5 5      Lower extr AP and lateral x-rays on the CT abdomen  views from 3/20/2013 show very mild lumbar scoliosis with spondylosis at L5-S1. Normal pelvic and hip anatomy.   No apparent pars defect       MRI of the lumbar spine from 3/13/15 shows spondylosis at L4-5 L5-S

## 2018-11-05 ENCOUNTER — HOSPITAL ENCOUNTER (OUTPATIENT)
Dept: GENERAL RADIOLOGY | Facility: HOSPITAL | Age: 53
Discharge: HOME | End: 2018-11-05
Attending: PHYSICIAN ASSISTANT
Payer: COMMERCIAL

## 2018-11-05 ENCOUNTER — OFFICE VISIT (OUTPATIENT)
Dept: SURGERY | Facility: CLINIC | Age: 53
End: 2018-11-05
Payer: COMMERCIAL

## 2018-11-05 ENCOUNTER — TELEPHONE (OUTPATIENT)
Dept: SURGERY | Facility: CLINIC | Age: 53
End: 2018-11-05

## 2018-11-05 VITALS — HEART RATE: 82 BPM | DIASTOLIC BLOOD PRESSURE: 70 MMHG | SYSTOLIC BLOOD PRESSURE: 122 MMHG

## 2018-11-05 DIAGNOSIS — M25.552 HIP PAIN, ACUTE, LEFT: ICD-10-CM

## 2018-11-05 DIAGNOSIS — M25.552 HIP PAIN, ACUTE, LEFT: Primary | ICD-10-CM

## 2018-11-05 PROCEDURE — 73502 X-RAY EXAM HIP UNI 2-3 VIEWS: CPT | Performed by: PHYSICIAN ASSISTANT

## 2018-11-05 PROCEDURE — 99212 OFFICE O/P EST SF 10 MIN: CPT | Performed by: PHYSICIAN ASSISTANT

## 2018-11-05 RX ORDER — METHYLPREDNISOLONE 4 MG/1
TABLET ORAL
Qty: 1 PACKAGE | Refills: 0 | Status: SHIPPED | OUTPATIENT
Start: 2018-11-05 | End: 2019-06-26

## 2018-11-05 NOTE — PROGRESS NOTES
Pt is here for follow for worsening conditions. Pt states that since the last time we seen her in March she is having left hip pain. Pt states that she has weakness in the left hip. Pt states that she has burning in the left thigh.      Pain Scale 2/10

## 2018-11-05 NOTE — PROGRESS NOTES
JB Neurosurgery follow-up        HISTORY OF PRESENT Shahid Espino is a 48year old RH female here in follow up. Complaining of left hip pain with walking especially with heels or elevated shoes climbing stairs.   It is quite severe deep in her Doing very well after returning to work 8 hours a day 5 days a week. States her neck is sore but she denies any neck pain. She denies any occipital headaches currently she has had 2 episodes since returning to work.   She is off Norco she has not taken Va She is feeling better. She is getting bilateral forearm, hand and feet numbness and tingling with cervical flexion. This is on and of every day. He walking is better. Her overall strength is improving. She rarely gets left arm pain.  She wouldlike to procee here for spinal consultation. She has had back pain on and off for several months. Her back pain generally is about a 4/10. Currently she has no back pain. The back pain was worse when she is lying down at night.   She is getting achiness in her bilater family history includes Alcohol and Other Disorders Associated in her maternal grandfather; Arrhythmia in her father; CHF in her maternal grandmother; Cancer in her father; Diabetes in her maternal grandfather and mother; Hypertension in her paternal grand X-rays of the cervical spine 2/1/18 show good surgical fusion forming at C3-7 anteriorly. She has a fusion forming posteriorly at C2-3 with no further slippage to 3    X-ray of the cervical spine 1/4/2018  She has a maturing fusion from C3-C7.   Posterior Repeat MRI of the lumbar spine 9/8/17. Again shows L1-2 spondylosis unchanged. Improvement in the right annular tear at L4-5. More diffuse disc bulging and facet arthropathy at L4-5 with acquired foraminal stenosis left greater than right.   Left L5-S1 d

## 2018-11-06 ENCOUNTER — PATIENT MESSAGE (OUTPATIENT)
Dept: SURGERY | Facility: CLINIC | Age: 53
End: 2018-11-06

## 2018-11-06 RX ORDER — HYDROCODONE BITARTRATE AND ACETAMINOPHEN 10; 325 MG/1; MG/1
1 TABLET ORAL EVERY 4 HOURS PRN
Qty: 60 TABLET | Refills: 0 | OUTPATIENT
Start: 2018-11-06 | End: 2018-11-06

## 2018-11-06 RX ORDER — HYDROCODONE BITARTRATE AND ACETAMINOPHEN 10; 325 MG/1; MG/1
1 TABLET ORAL EVERY 4 HOURS PRN
Qty: 60 TABLET | Refills: 0 | Status: SHIPPED | OUTPATIENT
Start: 2018-11-06 | End: 2019-06-26

## 2018-11-06 NOTE — TELEPHONE ENCOUNTER
From: Lourena Kocher  To: Timmy Severe, [place]  Sent: 11/6/2018 10:11 AM CST  Subject: Non-Urgent Medical Question    Good morning, I suppose I should be scheduling an MRI. I will do that. I have no Norco.. Geneva Downs I thought I did. Macie Ovens got rid of it. ...did n

## 2018-11-06 NOTE — TELEPHONE ENCOUNTER
Medication: Norco     Date of last refill: 12/14/17  [place] 42/06/81    Last office visit: 11/5/18  Due back to clinic per last office note:  n/a  Date next office visit scheduled:  None scheduled      Last OV note dieudonne

## 2018-11-08 ENCOUNTER — HOSPITAL ENCOUNTER (OUTPATIENT)
Dept: MRI IMAGING | Facility: HOSPITAL | Age: 53
Discharge: HOME | End: 2018-11-08
Attending: PHYSICIAN ASSISTANT
Payer: COMMERCIAL

## 2018-11-08 DIAGNOSIS — M25.552 HIP PAIN, ACUTE, LEFT: ICD-10-CM

## 2018-11-08 PROCEDURE — 73721 MRI JNT OF LWR EXTRE W/O DYE: CPT | Performed by: PHYSICIAN ASSISTANT

## 2018-11-21 ENCOUNTER — HOSPITAL ENCOUNTER (OUTPATIENT)
Dept: GENERAL RADIOLOGY | Facility: HOSPITAL | Age: 53
Discharge: HOME | End: 2018-11-21
Attending: ORTHOPAEDIC SURGERY
Payer: COMMERCIAL

## 2018-11-21 DIAGNOSIS — S73.192A LABRAL TEAR OF LEFT HIP JOINT: ICD-10-CM

## 2018-11-21 PROCEDURE — 20610 DRAIN/INJ JOINT/BURSA W/O US: CPT | Performed by: ORTHOPAEDIC SURGERY

## 2018-11-21 PROCEDURE — 77002 NEEDLE LOCALIZATION BY XRAY: CPT | Performed by: ORTHOPAEDIC SURGERY

## 2018-11-21 RX ORDER — TRIAMCINOLONE ACETONIDE 40 MG/ML
INJECTION, SUSPENSION INTRA-ARTICULAR; INTRAMUSCULAR
Status: COMPLETED
Start: 2018-11-21 | End: 2018-11-21

## 2018-11-21 RX ORDER — ROPIVACAINE HYDROCHLORIDE 5 MG/ML
INJECTION, SOLUTION EPIDURAL; INFILTRATION; PERINEURAL
Status: DISPENSED
Start: 2018-11-21 | End: 2018-11-21

## 2018-11-21 RX ADMIN — TRIAMCINOLONE ACETONIDE 80 MG: 40 INJECTION, SUSPENSION INTRA-ARTICULAR; INTRAMUSCULAR at 09:45:00

## 2019-03-15 ENCOUNTER — TELEPHONE (OUTPATIENT)
Dept: INTERNAL MEDICINE CLINIC | Facility: CLINIC | Age: 54
End: 2019-03-15

## 2019-08-12 ENCOUNTER — TELEPHONE (OUTPATIENT)
Dept: PODIATRY CLINIC | Facility: CLINIC | Age: 54
End: 2019-08-12

## 2019-08-13 NOTE — TELEPHONE ENCOUNTER
Spoke to pt and scheduled appt for foot pain with [facility] on 08/21/19 at 2:45pm. I accidentally informed pt that this will be in [facility] site. Called pt back immediately and LM that appt is in [facility] site. Gave [facility] address for [place].

## 2019-08-21 ENCOUNTER — HOSPITAL ENCOUNTER (OUTPATIENT)
Dept: GENERAL RADIOLOGY | Age: 54
Discharge: HOME | End: 2019-08-21
Attending: PODIATRIST
Payer: COMMERCIAL

## 2019-08-21 ENCOUNTER — OFFICE VISIT (OUTPATIENT)
Dept: PODIATRY CLINIC | Facility: CLINIC | Age: 54
End: 2019-08-21
Payer: COMMERCIAL

## 2019-08-21 VITALS — SYSTOLIC BLOOD PRESSURE: 112 MMHG | RESPIRATION RATE: 24 BRPM | DIASTOLIC BLOOD PRESSURE: 70 MMHG | HEART RATE: 81 BPM

## 2019-08-21 DIAGNOSIS — M77.50 CAPSULITIS OF METATARSOPHALANGEAL (MTP) JOINT: ICD-10-CM

## 2019-08-21 DIAGNOSIS — M20.41 HAMMER TOE OF RIGHT FOOT: ICD-10-CM

## 2019-08-21 DIAGNOSIS — M77.50 CAPSULITIS OF METATARSOPHALANGEAL (MTP) JOINT: Primary | ICD-10-CM

## 2019-08-21 PROCEDURE — 73630 X-RAY EXAM OF FOOT: CPT | Performed by: PODIATRIST

## 2019-08-21 PROCEDURE — 99213 OFFICE O/P EST LOW 20 MIN: CPT | Performed by: PODIATRIST

## 2019-08-21 PROCEDURE — 20600 DRAIN/INJ JOINT/BURSA W/O US: CPT | Performed by: PODIATRIST

## 2019-08-21 RX ORDER — TRIAMCINOLONE ACETONIDE 40 MG/ML
20 INJECTION, SUSPENSION INTRA-ARTICULAR; INTRAMUSCULAR ONCE
Status: COMPLETED | OUTPATIENT
Start: 2019-08-21 | End: 2019-08-21

## 2019-08-21 RX ADMIN — TRIAMCINOLONE ACETONIDE 20 MG: 40 INJECTION, SUSPENSION INTRA-ARTICULAR; INTRAMUSCULAR at 15:27:00

## 2019-08-21 NOTE — PROGRESS NOTES
Per Dr. Smith Garcia, draw up 0.5 cc of 0.5 % Marcaine and 0.5 cc of Kenalog 40 for injection to right foot.  RR

## 2019-08-23 NOTE — PROGRESS NOTES
Lamine Pelletier is a 47year old female. Patient presents with:  Consult: right foot pain and swelling -- States ROM is limited in toes. Onset early July 2018. States she dropped a chair on foot. No xrays taken. Rates pain 1/10 and constant.  States fo UNLISTED Left 2015, 2016    left wrist ganglion cyst excision    • INTRAOPERATIVE NEURO MONITORING N/A 10/25/2017    Performed by Latosha Randle MD at [address]   • OTHER  10/2017 and 12/2017    neck surgery   • OTHER  12/06/2017    POSTERIOR CERVICAL Exam  GENERAL: well developed, well nourished, in no apparent distress  EXTREMITIES:   1. Integument: The skin on the patient's right foot was examined it is warm, dry and supple nails are of normal appearance and thickness   2. Vascular:  The patient has e understanding of these issues and agrees to the plan. Return in about 3 weeks (around 9/11/2019).     Lacey Horn DPM  8/23/2019

## 2019-08-26 NOTE — TELEPHONE ENCOUNTER
Pt came to appt with [facility] at correct site in [facility] on 08/21/19.

## 2019-09-12 ENCOUNTER — APPOINTMENT (OUTPATIENT)
Dept: GENERAL RADIOLOGY | Facility: HOSPITAL | Age: 54
End: 2019-09-12
Attending: EMERGENCY MEDICINE
Payer: COMMERCIAL

## 2019-09-12 ENCOUNTER — HOSPITAL ENCOUNTER (EMERGENCY)
Facility: HOSPITAL | Age: 54
Discharge: HOME | End: 2019-09-12
Attending: EMERGENCY MEDICINE
Payer: COMMERCIAL

## 2019-09-12 ENCOUNTER — APPOINTMENT (OUTPATIENT)
Dept: CT IMAGING | Facility: HOSPITAL | Age: 54
End: 2019-09-12
Attending: EMERGENCY MEDICINE
Payer: COMMERCIAL

## 2019-09-12 VITALS
WEIGHT: 135 LBS | TEMPERATURE: 98 F | RESPIRATION RATE: 21 BRPM | SYSTOLIC BLOOD PRESSURE: 123 MMHG | HEART RATE: 66 BPM | BODY MASS INDEX: 21.69 KG/M2 | HEIGHT: 66 IN | DIASTOLIC BLOOD PRESSURE: 70 MMHG | OXYGEN SATURATION: 97 %

## 2019-09-12 DIAGNOSIS — R07.9 CHEST PAIN OF UNCERTAIN ETIOLOGY: Primary | ICD-10-CM

## 2019-09-12 LAB
ALBUMIN SERPL-MCNC: 4.3 G/DL (ref 3.4–5)
ALBUMIN/GLOB SERPL: 1.2 {RATIO} (ref 1–2)
ALP LIVER SERPL-CCNC: 61 U/L (ref 41–108)
ALT SERPL-CCNC: 27 U/L (ref 13–56)
ANION GAP SERPL CALC-SCNC: 8 MMOL/L (ref 0–18)
AST SERPL-CCNC: 23 U/L (ref 15–37)
ATRIAL RATE: 68 BPM
BASOPHILS # BLD AUTO: 0.06 X10(3) UL (ref 0–0.2)
BASOPHILS NFR BLD AUTO: 0.6 %
BILIRUB SERPL-MCNC: 0.3 MG/DL (ref 0.1–2)
BUN BLD-MCNC: 22 MG/DL (ref 7–18)
BUN/CREAT SERPL: 21.6 (ref 10–20)
CALCIUM BLD-MCNC: 8.8 MG/DL (ref 8.5–10.1)
CHLORIDE SERPL-SCNC: 107 MMOL/L (ref 98–112)
CO2 SERPL-SCNC: 30 MMOL/L (ref 21–32)
CREAT BLD-MCNC: 1.02 MG/DL (ref 0.55–1.02)
DEPRECATED RDW RBC AUTO: 44.4 FL (ref 35.1–46.3)
EOSINOPHIL # BLD AUTO: 0.12 X10(3) UL (ref 0–0.7)
EOSINOPHIL NFR BLD AUTO: 1.2 %
ERYTHROCYTE [DISTWIDTH] IN BLOOD BY AUTOMATED COUNT: 12.4 % (ref 11–15)
GLOBULIN PLAS-MCNC: 3.5 G/DL (ref 2.8–4.4)
GLUCOSE BLD-MCNC: 104 MG/DL (ref 70–99)
HCT VFR BLD AUTO: 42.7 % (ref 35–48)
HGB BLD-MCNC: 14 G/DL (ref 12–16)
IMM GRANULOCYTES # BLD AUTO: 0.03 X10(3) UL (ref 0–1)
IMM GRANULOCYTES NFR BLD: 0.3 %
LYMPHOCYTES # BLD AUTO: 2.05 X10(3) UL (ref 1–4)
LYMPHOCYTES NFR BLD AUTO: 20.1 %
M PROTEIN MFR SERPL ELPH: 7.8 G/DL (ref 6.4–8.2)
MCH RBC QN AUTO: 31.9 PG (ref 26–34)
MCHC RBC AUTO-ENTMCNC: 32.8 G/DL (ref 31–37)
MCV RBC AUTO: 97.3 FL (ref 80–100)
MONOCYTES # BLD AUTO: 0.6 X10(3) UL (ref 0.1–1)
MONOCYTES NFR BLD AUTO: 5.9 %
NEUTROPHILS # BLD AUTO: 7.33 X10 (3) UL (ref 1.5–7.7)
NEUTROPHILS # BLD AUTO: 7.33 X10(3) UL (ref 1.5–7.7)
NEUTROPHILS NFR BLD AUTO: 71.9 %
OSMOLALITY SERPL CALC.SUM OF ELEC: 304 MOSM/KG (ref 275–295)
P AXIS: 33 DEGREES
P-R INTERVAL: 136 MS
PLATELET # BLD AUTO: 200 10(3)UL (ref 150–450)
POTASSIUM SERPL-SCNC: 3.8 MMOL/L (ref 3.5–5.1)
Q-T INTERVAL: 386 MS
QRS DURATION: 102 MS
QTC CALCULATION (BEZET): 410 MS
R AXIS: 27 DEGREES
RBC # BLD AUTO: 4.39 X10(6)UL (ref 3.8–5.3)
SODIUM SERPL-SCNC: 145 MMOL/L (ref 136–145)
T AXIS: 28 DEGREES
TROPONIN I SERPL-MCNC: <0.045 NG/ML (ref ?–0.04)
VENTRICULAR RATE: 68 BPM
WBC # BLD AUTO: 10.2 X10(3) UL (ref 4–11)

## 2019-09-12 PROCEDURE — 80053 COMPREHEN METABOLIC PANEL: CPT | Performed by: EMERGENCY MEDICINE

## 2019-09-12 PROCEDURE — 85025 COMPLETE CBC W/AUTO DIFF WBC: CPT | Performed by: EMERGENCY MEDICINE

## 2019-09-12 PROCEDURE — 84484 ASSAY OF TROPONIN QUANT: CPT | Performed by: EMERGENCY MEDICINE

## 2019-09-12 PROCEDURE — 71260 CT THORAX DX C+: CPT | Performed by: EMERGENCY MEDICINE

## 2019-09-12 PROCEDURE — 93010 ELECTROCARDIOGRAM REPORT: CPT

## 2019-09-12 PROCEDURE — 93005 ELECTROCARDIOGRAM TRACING: CPT

## 2019-09-12 PROCEDURE — 99285 EMERGENCY DEPT VISIT HI MDM: CPT

## 2019-09-12 PROCEDURE — 71045 X-RAY EXAM CHEST 1 VIEW: CPT | Performed by: EMERGENCY MEDICINE

## 2019-09-12 PROCEDURE — 36415 COLL VENOUS BLD VENIPUNCTURE: CPT

## 2019-09-12 NOTE — ED INITIAL ASSESSMENT (HPI)
Pt c/o chest pain over the fast few day, but today she woke up because of a \"thump\" in her chest and left arm numbness       Pt denies any SOB

## 2019-09-12 NOTE — ED PROVIDER NOTES
Patient Seen in: [facility] Emergency Department    History   Patient presents with:  Chest Pain Angina (cardiovascular)    Stated Complaint: chest pain     HPI    49-year-old female presents with chest pain.   She reports she started having discomfo at [facility] MAIN OR   • WRIST GANGLION CYST EXCISION Left 12/29/2016    Performed by Kenny Connelly MD at [facility] MAIN OR   • [address] 10/9/2015    Performed by Kenny Connelly MD at [facility] -----------         ------                     CBC W/ DIFFERENTIAL[[id number]]                              Final result                 Please view results for these tests on the individual orders.    RAINBOW DRAW BLUE   RAINBOW DRAW LAVENDER (cpt=71045)    Result Date: 9/12/2019  PROCEDURE:  XR CHEST AP PORTABLE  (CPT=71045)  TECHNIQUE:  AP chest radiograph was obtained. COMPARISON:  TERRENCE CHEST AP PORT, 4/07/2010, 18:15.   INDICATIONS:  chest pain  PATIENT STATED HISTORY: (As transcribed b Tara Kerr MD on 8/22/2019 at 9:25            MDM   59-year-old female presents with chest pain and tingling and numbness in the left arm. There are no associated symptoms. There are no aggravating alleviating factors. EKG is unremarkable. Await labs.   JUNIOR

## 2019-11-18 ENCOUNTER — HOSPITAL ENCOUNTER (OUTPATIENT)
Dept: GENERAL RADIOLOGY | Facility: HOSPITAL | Age: 54
Discharge: HOME | End: 2019-11-18
Attending: PREVENTIVE MEDICINE
Payer: OTHER MISCELLANEOUS

## 2019-11-18 ENCOUNTER — OFFICE VISIT (OUTPATIENT)
Dept: OTHER | Facility: HOSPITAL | Age: 54
End: 2019-11-18
Attending: PREVENTIVE MEDICINE
Payer: OTHER MISCELLANEOUS

## 2019-11-18 DIAGNOSIS — M79.645 PAIN OF FINGER OF LEFT HAND: Primary | ICD-10-CM

## 2019-11-18 PROCEDURE — 73140 X-RAY EXAM OF FINGER(S): CPT | Performed by: PREVENTIVE MEDICINE

## 2019-11-20 ENCOUNTER — APPOINTMENT (OUTPATIENT)
Dept: OTHER | Facility: HOSPITAL | Age: 54
End: 2019-11-20
Attending: PREVENTIVE MEDICINE
Payer: OTHER MISCELLANEOUS

## 2020-03-03 ENCOUNTER — TELEPHONE (OUTPATIENT)
Dept: SURGERY | Facility: CLINIC | Age: 55
End: 2020-03-03

## 2020-03-03 ENCOUNTER — HOSPITAL ENCOUNTER (OUTPATIENT)
Dept: GENERAL RADIOLOGY | Facility: HOSPITAL | Age: 55
Discharge: HOME | End: 2020-03-03
Attending: PHYSICIAN ASSISTANT
Payer: COMMERCIAL

## 2020-03-03 ENCOUNTER — OFFICE VISIT (OUTPATIENT)
Dept: SURGERY | Facility: CLINIC | Age: 55
End: 2020-03-03
Payer: COMMERCIAL

## 2020-03-03 DIAGNOSIS — M48.02 CERVICAL STENOSIS OF SPINAL CANAL: ICD-10-CM

## 2020-03-03 DIAGNOSIS — R29.898 WEAKNESS OF BOTH UPPER EXTREMITIES: ICD-10-CM

## 2020-03-03 DIAGNOSIS — R29.898 WEAKNESS OF BOTH LOWER EXTREMITIES: ICD-10-CM

## 2020-03-03 DIAGNOSIS — Z98.1 S/P CERVICAL SPINAL FUSION: Primary | ICD-10-CM

## 2020-03-03 DIAGNOSIS — Z98.1 S/P CERVICAL SPINAL FUSION: ICD-10-CM

## 2020-03-03 PROCEDURE — 99214 OFFICE O/P EST MOD 30 MIN: CPT | Performed by: PHYSICIAN ASSISTANT

## 2020-03-03 PROCEDURE — 72052 X-RAY EXAM NECK SPINE 6/>VWS: CPT | Performed by: PHYSICIAN ASSISTANT

## 2020-03-03 RX ORDER — HYDROCODONE BITARTRATE AND ACETAMINOPHEN 10; 325 MG/1; MG/1
1 TABLET ORAL EVERY 4 HOURS PRN
Qty: 60 TABLET | Refills: 0 | Status: SHIPPED | OUTPATIENT
Start: 2020-03-03 | End: 2021-02-22

## 2020-03-03 RX ORDER — DIAZEPAM 10 MG/1
10 TABLET ORAL EVERY 12 HOURS PRN
Qty: 30 TABLET | Refills: 0 | Status: SHIPPED | OUTPATIENT
Start: 2020-03-03 | End: 2021-02-22

## 2020-03-03 RX ORDER — PREDNISONE 10 MG/1
10 TABLET ORAL DAILY
Qty: 30 TABLET | Refills: 0 | Status: SHIPPED | OUTPATIENT
Start: 2020-03-03 | End: 2021-02-22

## 2020-03-03 NOTE — TELEPHONE ENCOUNTER
Order for [facility] multi-post collar faxed to [employer] along with face sheet and insurance information

## 2020-03-03 NOTE — PROGRESS NOTES
[facility] Neurosurgery follow-up        HISTORY OF PRESENT ILLNESS: Rafiq Ferrer is a 47year old RH female here in follow up. She has C tightness. Bilateral hand numbness. Weakness in hands. Right sided back and hip cramping. Leg cramping.  Urinary Doing very well after returning to work 8 hours a day 5 days a week. States her neck is sore but she denies any neck pain. She denies any occipital headaches currently she has had 2 episodes since returning to work.   She is off Norco she has not taken Va She is feeling better. She is getting bilateral forearm, hand and feet numbness and tingling with cervical flexion. This is on and of every day. He walking is better. Her overall strength is improving. She rarely gets left arm pain.  She wouldlike to procee here for spinal consultation. She has had back pain on and off for several months. Her back pain generally is about a 4/10. Currently she has no back pain. The back pain was worse when she is lying down at night.   She is getting achiness in her bilater family history includes Alcohol and Other Disorders Associated in her maternal grandfather; Arrhythmia in her father; CHF in her maternal grandmother; Cancer in her father; Diabetes in her maternal grandfather and mother; Hypertension in her paternal grand She has a maturing fusion from C3-C7. Posterior instrumentation at C2-3. Slight lucency around the C2 screws with a slight increased angulation and anterior listhesis compared to her last image.       Postoperative x-rays showing a C2-3 posterior fusion 1.  C2-3 through spinal fusion with improved neurological function, improving and healing  2. C3-7 ACDF 10/25/17 healing well  3. Left L5-S1 lateral disc herniation   4. Thoracolumbar scoliosis  5.   L4-5, L5-S1 spondylosis with facet arthropathy and lef

## 2020-03-10 ENCOUNTER — TELEPHONE (OUTPATIENT)
Dept: SURGERY | Facility: CLINIC | Age: 55
End: 2020-03-10

## 2020-03-11 ENCOUNTER — HOSPITAL ENCOUNTER (OUTPATIENT)
Dept: MRI IMAGING | Facility: HOSPITAL | Age: 55
Discharge: HOME | End: 2020-03-11
Attending: PHYSICIAN ASSISTANT
Payer: COMMERCIAL

## 2020-03-11 DIAGNOSIS — R29.898 WEAKNESS OF BOTH LOWER EXTREMITIES: ICD-10-CM

## 2020-03-11 DIAGNOSIS — Z98.1 S/P CERVICAL SPINAL FUSION: ICD-10-CM

## 2020-03-11 DIAGNOSIS — M48.02 CERVICAL STENOSIS OF SPINAL CANAL: ICD-10-CM

## 2020-03-11 DIAGNOSIS — R29.898 WEAKNESS OF BOTH UPPER EXTREMITIES: ICD-10-CM

## 2020-03-11 PROCEDURE — 72141 MRI NECK SPINE W/O DYE: CPT | Performed by: PHYSICIAN ASSISTANT

## 2020-03-13 ENCOUNTER — OFFICE VISIT (OUTPATIENT)
Dept: SURGERY | Facility: CLINIC | Age: 55
End: 2020-03-13
Payer: COMMERCIAL

## 2020-03-13 VITALS — HEART RATE: 92 BPM | SYSTOLIC BLOOD PRESSURE: 110 MMHG | DIASTOLIC BLOOD PRESSURE: 70 MMHG

## 2020-03-13 DIAGNOSIS — Z98.1 S/P CERVICAL SPINAL FUSION: Primary | ICD-10-CM

## 2020-03-13 DIAGNOSIS — M48.02 CERVICAL STENOSIS OF SPINAL CANAL: ICD-10-CM

## 2020-03-13 DIAGNOSIS — G93.9 BRAIN LESION: ICD-10-CM

## 2020-03-13 DIAGNOSIS — S12.9XXS PSEUDOARTHROSIS OF CERVICAL SPINE, SEQUELA: ICD-10-CM

## 2020-03-13 PROCEDURE — 99213 OFFICE O/P EST LOW 20 MIN: CPT | Performed by: PHYSICIAN ASSISTANT

## 2020-03-13 NOTE — PROGRESS NOTES
Pt is here for follow up. Imaging: MRI C, XR C    Mancos: yes     Pt states the steroid helped a lot. No new symptoms.

## 2020-03-13 NOTE — PATIENT INSTRUCTIONS
Refill policies:    • Allow 2-3 business days for refills; controlled substances may take longer.   • Contact your pharmacy at least 5 days prior to running out of medication and have them send an electronic request or submit request through the “request re Depending on your insurance carrier, approval may take 3-10 days. It is highly recommended patients contact their insurance carrier directly to determine coverage.   If test is done without insurance authorization, patient may be responsible for the entire [facility] multi-post if her old aspirin is not effective  3.  vist therapy collar  4. Follow-up after MRI of the brain further recommendations be forthcoming based upon the imaging of the brain.

## 2020-03-13 NOTE — PROGRESS NOTES
[facility] Neurosurgery follow-up        HISTORY OF PRESENT ILLNESS: Silvia Zaragoza is a 47year old RH female returns in follow-up. She is feeling somewhat better with the steroids.   She continues with cervical tightness she had one episode with cerv ice both feet when up in the air she landed on her left knee hip and shoulder. She denies any acute neck pain. She denies any acute hip or shoulder pain. Now she is having tightness in the neck it hurts to rotate to the left more than right.   She does h numbness and tingling in the right toes on and off still. Her strength is improved and her legs and arms. She is having upper cervical and occipital pain postoperatively is gotten worse in the last 2 or 3 days. She denies any fevers.   Denies any shortne x-rays of her cervical spine. She has been taking the Norco Flexeril and Medrol she is modestly improved. Her cervical pain is currently about a 3/10. Her left shoulder and arm pain is a 7-8/10 it does improve but it is pretty consistently high.   She FOREARM/WRIST SURGERY UNLISTED      Comment: right wrist fx repair  10/9/15: FOREARM/WRIST SURGERY UNLISTED Left      Comment: left wrist ganglion cyst excision   No date: OTHER SURGICAL HISTORY      Comment: ovarian cystectomy     FAMILY HISTORY:  family extremity strength:      Iliopsoas  Hamstrings   Quads    D-flexion P-flexion Eversion   Right       5         5       5         5 5    Left       5         5       5         5 5         IMAGING:   X-rays of the cervical spine 3/3/2020 shows an anterior pl spondylolisthesis with change of 3.5 mm between flexion and extension. C3-4 C4-5 retrolisthesis which corrects on flexion.   C5-6, C6-7 spondylosis with anterior spurring and minimal motion    MRI of the brain 4/8/2010 normal age-related changes.     X-ray 3.  vist therapy collar  4. Follow-up after MRI of the brain further recommendations be forthcoming based upon the imaging of the brain.     MARTHA Tee M.S., SWAPNIL Borges  [address] [facility], [address] [facility] [place], [address] [facility] ER

## 2020-03-18 ENCOUNTER — HOSPITAL ENCOUNTER (OUTPATIENT)
Dept: MRI IMAGING | Facility: HOSPITAL | Age: 55
Discharge: HOME | End: 2020-03-18
Attending: PHYSICIAN ASSISTANT
Payer: COMMERCIAL

## 2020-03-18 DIAGNOSIS — G93.9 BRAIN LESION: ICD-10-CM

## 2020-03-18 PROCEDURE — 70553 MRI BRAIN STEM W/O & W/DYE: CPT | Performed by: PHYSICIAN ASSISTANT

## 2020-03-18 PROCEDURE — A9575 INJ GADOTERATE MEGLUMI 0.1ML: HCPCS | Performed by: PHYSICIAN ASSISTANT

## 2020-07-02 ENCOUNTER — TELEPHONE (OUTPATIENT)
Dept: INTERNAL MEDICINE CLINIC | Facility: CLINIC | Age: 55
End: 2020-07-02

## 2020-07-02 NOTE — TELEPHONE ENCOUNTER
Feels like she \"got punched in the throat\" feels her voice is hoarse, feels her thyroid may be enlarged, chokes on certain foods. Been going on for about a month. Pls call Pt on work phone first to discuss. 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

## 2020-07-02 NOTE — TELEPHONE ENCOUNTER
Patient states over last month has had episodes of raspy voice, needing to clear her throat. Patient denies changes after eating. Patient states when she eats she feels she chokes on food.  Patient states she feels her throat is bruised or described sensati

## 2020-07-07 NOTE — TELEPHONE ENCOUNTER
Future Appointments   Date Time Provider Alta Holloway   7/15/2020  8:00 AM Adelaide Benavides MD [facility]

## 2020-12-19 ENCOUNTER — IMMUNIZATION (OUTPATIENT)
Dept: LAB | Facility: HOSPITAL | Age: 55
End: 2020-12-19
Attending: PREVENTIVE MEDICINE

## 2020-12-19 DIAGNOSIS — Z23 NEED FOR VACCINATION: ICD-10-CM

## 2020-12-19 PROCEDURE — 0001A PFIZER-BIONTECH COVID-19 VACCINE: CPT

## 2021-01-09 ENCOUNTER — IMMUNIZATION (OUTPATIENT)
Dept: LAB | Facility: HOSPITAL | Age: 56
End: 2021-01-09
Attending: PREVENTIVE MEDICINE
Payer: COMMERCIAL

## 2021-01-09 DIAGNOSIS — Z23 NEED FOR VACCINATION: ICD-10-CM

## 2021-01-09 PROCEDURE — 0002A SARSCOV2 VAC 30MCG/0.3ML IM: CPT

## 2021-01-21 ENCOUNTER — TELEPHONE (OUTPATIENT)
Dept: INTERNAL MEDICINE CLINIC | Facility: CLINIC | Age: 56
End: 2021-01-21

## 2021-01-21 NOTE — TELEPHONE ENCOUNTER
Last ov 2017  Please romy to see if she has established with another provider.  (cancelled appt 7/2020 w AD)

## 2021-01-26 NOTE — TELEPHONE ENCOUNTER
Future Appointments   Date Time Provider Alta Holloway   2/22/2021  5:20 PM FEDE Leroy [facility]

## 2021-02-22 ENCOUNTER — OFFICE VISIT (OUTPATIENT)
Dept: INTERNAL MEDICINE CLINIC | Facility: CLINIC | Age: 56
End: 2021-02-22
Payer: COMMERCIAL

## 2021-02-22 VITALS
WEIGHT: 154 LBS | HEIGHT: 66 IN | DIASTOLIC BLOOD PRESSURE: 76 MMHG | HEART RATE: 88 BPM | TEMPERATURE: 97 F | SYSTOLIC BLOOD PRESSURE: 104 MMHG | BODY MASS INDEX: 24.75 KG/M2

## 2021-02-22 DIAGNOSIS — Z13.0 SCREENING FOR BLOOD DISEASE: ICD-10-CM

## 2021-02-22 DIAGNOSIS — Z13.220 SCREENING, LIPID: ICD-10-CM

## 2021-02-22 DIAGNOSIS — J45.20 MILD INTERMITTENT ASTHMA WITHOUT COMPLICATION: ICD-10-CM

## 2021-02-22 DIAGNOSIS — E04.2 NONTOXIC MULTINODULAR GOITER: ICD-10-CM

## 2021-02-22 DIAGNOSIS — Z13.1 SCREENING FOR DIABETES MELLITUS: ICD-10-CM

## 2021-02-22 DIAGNOSIS — Z12.31 ENCOUNTER FOR SCREENING MAMMOGRAM FOR MALIGNANT NEOPLASM OF BREAST: ICD-10-CM

## 2021-02-22 DIAGNOSIS — Z12.4 SCREENING FOR CERVICAL CANCER: ICD-10-CM

## 2021-02-22 DIAGNOSIS — Z11.51 SCREENING FOR HUMAN PAPILLOMAVIRUS (HPV): ICD-10-CM

## 2021-02-22 DIAGNOSIS — Z00.00 ROUTINE GENERAL MEDICAL EXAMINATION AT A HEALTH CARE FACILITY: Primary | ICD-10-CM

## 2021-02-22 DIAGNOSIS — Z13.29 SCREENING FOR THYROID DISORDER: ICD-10-CM

## 2021-02-22 PROBLEM — T88.59XA NAUSEA AFTER ANESTHESIA: Status: RESOLVED | Noted: 2017-12-08 | Resolved: 2021-02-22

## 2021-02-22 PROBLEM — R11.0 NAUSEA AFTER ANESTHESIA: Status: RESOLVED | Noted: 2017-12-08 | Resolved: 2021-02-22

## 2021-02-22 PROBLEM — M67.432 GANGLION CYST OF VOLAR ASPECT OF LEFT WRIST: Status: RESOLVED | Noted: 2017-01-10 | Resolved: 2021-02-22

## 2021-02-22 PROCEDURE — 3078F DIAST BP <80 MM HG: CPT | Performed by: NURSE PRACTITIONER

## 2021-02-22 PROCEDURE — 3074F SYST BP LT 130 MM HG: CPT | Performed by: NURSE PRACTITIONER

## 2021-02-22 PROCEDURE — 99396 PREV VISIT EST AGE 40-64: CPT | Performed by: NURSE PRACTITIONER

## 2021-02-22 PROCEDURE — 88175 CYTOPATH C/V AUTO FLUID REDO: CPT | Performed by: NURSE PRACTITIONER

## 2021-02-22 PROCEDURE — 3008F BODY MASS INDEX DOCD: CPT | Performed by: NURSE PRACTITIONER

## 2021-02-22 PROCEDURE — 87624 HPV HI-RISK TYP POOLED RSLT: CPT | Performed by: NURSE PRACTITIONER

## 2021-02-22 RX ORDER — ALBUTEROL SULFATE 90 UG/1
2 AEROSOL, METERED RESPIRATORY (INHALATION) EVERY 4 HOURS PRN
Qty: 1 INHALER | Refills: 6 | Status: SHIPPED | OUTPATIENT
Start: 2021-02-22

## 2021-02-22 NOTE — PROGRESS NOTES
Natasha Hagen is a 54year old female who presents for a complete physical exam:       Patient complains of:     Asthma:  Score 25. Proair to the pharmacy.       Wt Readings from Last 6 Encounters:  02/22/21 : 154 lb (69.9 kg)  09/12/19 : 135 lb (61 MAIN OR   • COLONOSCOPY  4/26/13   • ENDOSCOPIC ULTRASOUND (EUS) N/A 6/2/2015    Performed by Myra Mustafa MD at [address] [place] [address] ENDOSCOPY   • [address] [place] [address]  2015   • ESOPHAGOGASTRODUODENOSCOPY (EGD) N/A 6/2/2015    Performed by Myra Mustafa MD at [address] [place] [address] ENDO Osteoperosis Prevention was discussed. Reviewed Calcium, Vitamin D supplementation and Weight Bearing Exercises. Patient is performing weight bearing exercises. Patient is currently taking Vitamin D supplementation.         REVIEW OF SYSTEMS:   GENER intermittent asthma without complication  Stable  Reviewed and agree with plan as discussed.    Nontoxic multinodular goiter  Check US  Encounter for screening mammogram for malignant neoplasm of breast  Screening, lipid  Screening for blood disease  Screen

## 2021-02-23 LAB — HPV I/H RISK 1 DNA SPEC QL NAA+PROBE: NEGATIVE

## 2021-03-02 ENCOUNTER — LAB ENCOUNTER (OUTPATIENT)
Dept: LAB | Age: 56
End: 2021-03-02
Attending: PREVENTIVE MEDICINE
Payer: COMMERCIAL

## 2021-03-02 ENCOUNTER — TELEPHONE (OUTPATIENT)
Dept: INTERNAL MEDICINE CLINIC | Facility: HOSPITAL | Age: 56
End: 2021-03-02

## 2021-03-02 DIAGNOSIS — Z20.822 SUSPECTED COVID-19 VIRUS INFECTION: ICD-10-CM

## 2021-03-02 DIAGNOSIS — Z20.822 SUSPECTED COVID-19 VIRUS INFECTION: Primary | ICD-10-CM

## 2021-03-02 LAB — SARS-COV-2 RNA RESP QL NAA+PROBE: NOT DETECTED

## 2021-03-02 NOTE — TELEPHONE ENCOUNTER
Results and RTW guidelines:    COVID RESULT GIVEN:      Test type:    [x] Rapid         [] Alinity      [x] NEGATIVE     Ordered Alinity retest?  [x]Yes   [] No        Date ordered: 3/2/2021          Dated to be taken:  3/3/2021    If Yes, PLACE ORDER

## 2021-03-02 NOTE — TELEPHONE ENCOUNTER
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 Was scheduled to work Monday 03.01.2021 and today, however is at home.      Did you have close contact with someone on your unit while not wearing a mask? (e.g., during meal breaks):  Yes [x]   No []    If ye exposure:    [] Symptomatic: Test w/ Rapid now; repeat w/ Alinity in 1-2 days if persistent symptoms or high suspicion. Home until result received and discussed with COVID hotline. [] Asymptomatic: Test w/ Alinityin 5-7 days.  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

## 2021-03-03 ENCOUNTER — LAB ENCOUNTER (OUTPATIENT)
Dept: LAB | Age: 56
End: 2021-03-03
Attending: NURSE PRACTITIONER

## 2021-03-03 DIAGNOSIS — Z20.822 SUSPECTED COVID-19 VIRUS INFECTION: ICD-10-CM

## 2021-03-04 ENCOUNTER — TELEMEDICINE (OUTPATIENT)
Dept: INTERNAL MEDICINE CLINIC | Facility: CLINIC | Age: 56
End: 2021-03-04

## 2021-03-04 DIAGNOSIS — R68.83 CHILLS: ICD-10-CM

## 2021-03-04 DIAGNOSIS — R53.81 MALAISE: ICD-10-CM

## 2021-03-04 DIAGNOSIS — R51.9 NEW ONSET OF HEADACHES: Primary | ICD-10-CM

## 2021-03-04 DIAGNOSIS — J02.9 SORE THROAT: ICD-10-CM

## 2021-03-04 LAB — SARS-COV-2 RNA RESP QL NAA+PROBE: NOT DETECTED

## 2021-03-04 PROCEDURE — 99213 OFFICE O/P EST LOW 20 MIN: CPT | Performed by: NURSE PRACTITIONER

## 2021-03-04 NOTE — PROGRESS NOTES
Due to COVID-19 ACTION PLAN, the patient's office visit was converted to a video visit. This visit is conducted using video and audio. The patient consents to this service.   The patient understands and accepts financial responsibility for any deductible, no diarrhea or constipation  MUSCULOSKELETAL:  No arthralgias or myalgias  NEURO:  headaches,       EXAM:   Limited exam as this is a video visit. Appropriate affect, alert and oriented x 3. No distress. No conversational dyspnea.   Speaking in full sent

## 2021-03-05 NOTE — TELEPHONE ENCOUNTER
Results and RTW guidelines:    COVID RESULT GIVEN:      Test type:    [] Rapid         [x] Alinity      [x] NEGATIVE     Ordered Alinity retest?  []Yes   [x] No      Notes:  Afebrile. Mild HA, Denies GI maladies.     RTW PLAN:    [] RTW 10 days with randall

## 2021-03-17 ENCOUNTER — HOSPITAL ENCOUNTER (OUTPATIENT)
Dept: MAMMOGRAPHY | Facility: HOSPITAL | Age: 56
Discharge: HOME | End: 2021-03-17
Attending: NURSE PRACTITIONER
Payer: COMMERCIAL

## 2021-03-17 DIAGNOSIS — Z12.31 ENCOUNTER FOR SCREENING MAMMOGRAM FOR MALIGNANT NEOPLASM OF BREAST: ICD-10-CM

## 2021-03-17 PROCEDURE — 77067 SCR MAMMO BI INCL CAD: CPT | Performed by: NURSE PRACTITIONER

## 2021-03-17 PROCEDURE — 77063 BREAST TOMOSYNTHESIS BI: CPT | Performed by: NURSE PRACTITIONER

## 2021-07-27 ENCOUNTER — NURSE ONLY (OUTPATIENT)
Dept: LAB | Age: 56
End: 2021-07-27
Attending: PREVENTIVE MEDICINE
Payer: COMMERCIAL

## 2021-07-27 ENCOUNTER — TELEPHONE (OUTPATIENT)
Dept: INTERNAL MEDICINE CLINIC | Facility: HOSPITAL | Age: 56
End: 2021-07-27

## 2021-07-27 DIAGNOSIS — Z20.822 SUSPECTED COVID-19 VIRUS INFECTION: Primary | ICD-10-CM

## 2021-07-27 DIAGNOSIS — Z20.822 SUSPECTED COVID-19 VIRUS INFECTION: ICD-10-CM

## 2021-07-27 LAB — SARS-COV-2 RNA RESP QL NAA+PROBE: NOT DETECTED

## 2021-07-27 NOTE — TELEPHONE ENCOUNTER
Department: Medial Onc                                 [x] [facility]  []YSABEL   [] [address] [place] [address]    Dept Manager/Supervisor/team or clinical lead: Racheal Caceres    Position:  [] MD     [x] RN     [] Respiratory Therapist     [] PCT     [] Other     HAVE YOU RECEIVED called . When are you next scheduled to work? tomorrow    Did you have close contact with someone on your unit while not wearing a mask? (e.g., during meal breaks):  Yes []   No [x]    If yes, who:   Do you share a workspace?  Yes [x]   No []       If y was instructed to call Central scheduling at [phone] or use [employer] to make an appointment for their testing and remain in their vehicle when arrived at site until they are contacted for testing    []  Plan noted above  []  Length of time to obtain re

## 2021-07-28 NOTE — TELEPHONE ENCOUNTER
Results and RTW guidelines:    COVID RESULT GIVEN:      Test type:    [x] Rapid         [] Alinity      [x] NEGATIVE     Ordered Alinity retest?  []Yes   [x] No (skip to RTW)       Notes:  Reports temp of 100.2 last night.   Planning to go to walk in clin

## 2021-07-29 ENCOUNTER — OFFICE VISIT (OUTPATIENT)
Dept: FAMILY MEDICINE CLINIC | Facility: CLINIC | Age: 56
End: 2021-07-29
Payer: COMMERCIAL

## 2021-07-29 VITALS
WEIGHT: 145 LBS | BODY MASS INDEX: 23.3 KG/M2 | TEMPERATURE: 98 F | HEART RATE: 95 BPM | OXYGEN SATURATION: 96 % | DIASTOLIC BLOOD PRESSURE: 70 MMHG | RESPIRATION RATE: 20 BRPM | SYSTOLIC BLOOD PRESSURE: 120 MMHG | HEIGHT: 66 IN

## 2021-07-29 DIAGNOSIS — J40 BRONCHITIS: Primary | ICD-10-CM

## 2021-07-29 DIAGNOSIS — J01.00 ACUTE MAXILLARY SINUSITIS, RECURRENCE NOT SPECIFIED: ICD-10-CM

## 2021-07-29 PROCEDURE — 3008F BODY MASS INDEX DOCD: CPT | Performed by: NURSE PRACTITIONER

## 2021-07-29 PROCEDURE — 3078F DIAST BP <80 MM HG: CPT | Performed by: NURSE PRACTITIONER

## 2021-07-29 PROCEDURE — 3074F SYST BP LT 130 MM HG: CPT | Performed by: NURSE PRACTITIONER

## 2021-07-29 PROCEDURE — 99213 OFFICE O/P EST LOW 20 MIN: CPT | Performed by: NURSE PRACTITIONER

## 2021-07-29 RX ORDER — AZITHROMYCIN 250 MG/1
TABLET, FILM COATED ORAL
Qty: 6 TABLET | Refills: 0 | Status: SHIPPED | OUTPATIENT
Start: 2021-07-29 | End: 2021-08-03

## 2021-07-29 RX ORDER — BENZONATATE 200 MG/1
200 CAPSULE ORAL 3 TIMES DAILY PRN
Qty: 20 CAPSULE | Refills: 0 | Status: SHIPPED | OUTPATIENT
Start: 2021-07-29 | End: 2021-07-29

## 2021-07-29 RX ORDER — AZITHROMYCIN 250 MG/1
TABLET, FILM COATED ORAL
Qty: 6 TABLET | Refills: 0 | Status: SHIPPED | OUTPATIENT
Start: 2021-07-29 | End: 2021-07-29

## 2021-07-29 RX ORDER — ALBUTEROL SULFATE 90 UG/1
AEROSOL, METERED RESPIRATORY (INHALATION)
Qty: 1 EACH | Refills: 0 | Status: SHIPPED | OUTPATIENT
Start: 2021-07-29

## 2021-07-29 RX ORDER — ALBUTEROL SULFATE 90 UG/1
AEROSOL, METERED RESPIRATORY (INHALATION)
Qty: 1 EACH | Refills: 0 | Status: SHIPPED | OUTPATIENT
Start: 2021-07-29 | End: 2021-07-29

## 2021-07-29 RX ORDER — BENZONATATE 200 MG/1
200 CAPSULE ORAL 3 TIMES DAILY PRN
Qty: 20 CAPSULE | Refills: 0 | Status: SHIPPED | OUTPATIENT
Start: 2021-07-29

## 2021-11-11 NOTE — TELEPHONE ENCOUNTER
PA initiated on 10/13/17, see referrals Oculoplastic Surgeon Procedure Text (F): After obtaining clear surgical margins the patient was sent to oculoplastics for surgical repair.  The patient understands they will receive post-surgical care and follow-up from the referring physician's office.

## 2021-12-15 ENCOUNTER — IMMUNIZATION (OUTPATIENT)
Dept: LAB | Facility: HOSPITAL | Age: 56
End: 2021-12-15
Attending: EMERGENCY MEDICINE
Payer: COMMERCIAL

## 2021-12-15 DIAGNOSIS — Z23 NEED FOR VACCINATION: Primary | ICD-10-CM

## 2021-12-15 PROCEDURE — 0004A SARSCOV2 VAC 30MCG/0.3ML IM: CPT

## 2025-01-06 NOTE — PROGRESS NOTES
Negative covid test. Results forwarded to employee center for resulting and follow up. on abx x 2 days for uti, still have burning when urinating

## 2025-07-17 ENCOUNTER — TELEPHONE (OUTPATIENT)
Dept: INTERNAL MEDICINE CLINIC | Facility: CLINIC | Age: 60
End: 2025-07-17

## 2025-08-01 ENCOUNTER — PATIENT OUTREACH (OUTPATIENT)
Dept: CASE MANAGEMENT | Age: 60
End: 2025-08-01

## (undated) DEVICE — GLOVE SURG SENSICARE SZ 7

## (undated) DEVICE — MAYFIELD® DISPOSABLE ADULT SKULL PIN (PLASTIC BASE): Brand: MAYFIELD®

## (undated) DEVICE — TAPE CLOTH ADHESIVE 3

## (undated) DEVICE — TRANSPOSAL ULTRAFLEX DUO/QUAD ULTRA CART MANIFOLD

## (undated) DEVICE — SUTURE VICRYL 3-0 RB-1

## (undated) DEVICE — UNDYED BRAIDED (POLYGLACTIN 910), SYNTHETIC ABSORBABLE SUTURE: Brand: COATED VICRYL

## (undated) DEVICE — ADHESIVE MASTISOL 2/3CC VL

## (undated) DEVICE — GLOVE BIOGEL M SURG SZ 71/2

## (undated) DEVICE — GLOVE SURG TRIUMPH SZ 8

## (undated) DEVICE — #15 STERILE STAINLESS BLADE: Brand: STERILE STAINLESS BLADES

## (undated) DEVICE — 3M(TM) TEGADERM(TM) TRANSPARENT FILM DRESSING FRAME STYLE 1628: Brand: 3M™ TEGADERM™

## (undated) DEVICE — BANDAGE ROLL,100% COTTON, 6 PLY, LARGE: Brand: KERLIX

## (undated) DEVICE — DRILL NEURO SOFT TOUCH 3.0X3.8

## (undated) DEVICE — SUTURE VICRYL 0 CT-1

## (undated) DEVICE — GOWN,SIRUS,FABRIC-REINFORCED,X-LARGE: Brand: MEDLINE

## (undated) DEVICE — BIT DRL 3.5MM CASPIAN OCT SPNL

## (undated) DEVICE — DRAPE TABLE COVER 44X90 TC-10

## (undated) DEVICE — BLADE CRANI SHAVER 4412A

## (undated) DEVICE — SOL  .9 1000ML BTL

## (undated) DEVICE — 3M(TM) MEDIPORE(TM) +PAD SOFT CLOTH ADHESIVE WOUND DRESSING 3569: Brand: 3M™ MEDIPORE™

## (undated) DEVICE — EAGLE PLUS ANTERIOR CERVICAL PLATE SYSTEM FIXED DEPTH DRILL - AO 12MM: Brand: EAGLE

## (undated) DEVICE — PROXIMATE SKIN STAPLERS (35 WIDE) CONTAINS 35 STAINLESS STEEL STAPLES (FIXED HEAD): Brand: PROXIMATE

## (undated) DEVICE — LIGHT HANDLE

## (undated) DEVICE — GLOVE SURG SENSICARE SZ 7-1/2

## (undated) DEVICE — PAD SACRAL SPAN AID

## (undated) DEVICE — DRAPE C-ARM UNIVERSAL

## (undated) DEVICE — FLOSEAL SEALENT STERILE 10ML

## (undated) DEVICE — 5.0MM X 7.0MM FLUTED DRUM

## (undated) DEVICE — RUBBER BAND STERILE

## (undated) DEVICE — VIOLET BRAIDED (POLYGLACTIN 910), SYNTHETIC ABSORBABLE SUTURE: Brand: COATED VICRYL

## (undated) DEVICE — DRAPE MICROSCOPE NEURO PENTERO

## (undated) DEVICE — LAMINECTOMY CDS: Brand: MEDLINE INDUSTRIES, INC.

## (undated) DEVICE — Device

## (undated) DEVICE — KENDALL SCD EXPRESS SLEEVES, THIGH LENGTH, MEDIUM: Brand: KENDALL SCD

## (undated) DEVICE — SUTURE VICRYL 2-0 CP-2

## (undated) DEVICE — 3.0MM NEURO (MATCH HEAD) SOFT TOUCH

## (undated) DEVICE — SUPER SPONGES,MEDIUM: Brand: KERLIX

## (undated) DEVICE — CAUTERY BLADE 2IN INS E1455

## (undated) DEVICE — DISTRACTION SCREW: Brand: MAYFIELD®

## (undated) DEVICE — COVER,TABLE,HVY DUTY,EXT,77"X96",STRL: Brand: MEDLINE

## (undated) DEVICE — DRAPE,THYROID,SOFT,STERILE: Brand: MEDLINE

## (undated) NOTE — LETTER
[facility] Testing Department  Phone: [phone]  Right Fax: [phone]  Pikk 20 Mary Cornell RN Date: 10/18/17    Patient Name: Amelia Edouard  Surgery Date: 10/25/2017    CSN: [id number]  Medical Record: [id number]

## (undated) NOTE — LETTER
ASTHMA ACTION PLAN for [facility]     : 1965     Date: 2021  Provider:  FEDE Richardson  Phone for doctor or clinic: [facility], [address] [address] [facility], [facility]. [address], [address] [place] [address] [phone]

## (undated) NOTE — LETTER
03/24/21        [facility]  [address] [facility]      Dear Karen Haynes records indicate that you have outstanding lab work and or testing that was ordered for you and has not yet been completed:  Orders Placed This Encounter

## (undated) NOTE — MR AVS SNAPSHOT
[facility] IM [address] [address] [phone]               Thank you for choosing us for your health care visit with Geovanna Suárez PA-C.   We are glad to serve you and happy to provide you with this fitzgerald These medications were sent to [address] [phone], [phone] [address], [facility]5 [facility]     Phone:  [phone]    - azithromycin 250 MG Tabs  - predniSONE 10 MG Tabs      You can get these medication

## (undated) NOTE — LETTER
18          Markie Guajardo  :  1965      To Whom It May Concern: This patient was seen in our office on 18 .   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

## (undated) NOTE — LETTER
ASTHMA ACTION PLAN for Gae Cancer     : 1965     Date: 2017  Provider:  Johanna Morgan PA-C  Phone for doctor or clinic: [address] [facility], [address] [facility] [place], [address] [place] [address]

## (undated) NOTE — LETTER
Date: 11/28/2017    Patient Name: Lourena Kocher          To Whom it may concern: This letter has been written at the patient's request. The above patient was seen at the [facility] for treatment of a medical condition.     This patient

## (undated) NOTE — LETTER
2017    RE: Herminio Jordan     : 1965    Dear Dr. Stephanie Zambrano,    This letter is to inform you that your patient is being scheduled for surgery with Dr. Jovany Lindsay on 17 at [facility]. We have asked the patient to contact your office to

## (undated) NOTE — LETTER
18          Laura Razo  :  1965      To Whom It May Concern: This patient was seen in our office on 18 .   Work status: with the following restriction: Max 8 hours per day, telecommute from home if possible, adjust commute time

## (undated) NOTE — Clinical Note
Noam Turner she is more myelopathic. Lets hold PT. She is going to see Dr Anthony Lawson. Her radiculopathy is better thanks to you!

## (undated) NOTE — Clinical Note
Patient currently does not have her hospital follow up scheduled. Pt is seeing Dr. Lisa Keith for FU on 11.9.17 and stated she prefers to see her PCP after the 9th. Encouraged her to make an appointment but pt still refused. TE was sent to triage to address.  Pt

## (undated) NOTE — LETTER
18          Shelly Stanford  :  1965      To Whom It May Concern: This patient was seen in our office on 18 . Work status: Full Duty  May return to work status per above effective 2018.     If this office may be of

## (undated) NOTE — LETTER
AUTHORIZATION FOR SURGICAL OPERATION OR OTHER PROCEDURE    1.  I hereby authorize Dr. Jesus Polanco and [facility] staff assigned to my case to perform the following operation and/or procedure at the [facility]:    Cortisone injection to right foot Time:  ________ A. M.  P.M.        Patient Name:  ______________________________________________________  (please print)      Patient signature:  ___________________________________________________             Relationship to Patient:

## (undated) NOTE — LETTER
[facility] Testing Department  Phone: [phone]  Right Fax: [phone]  [facility] Moustapha Santos RN Date: 11/29/17    Patient Name: Ryan Hinds  Surgery Date: 12/6/2017    CSN: [id number]  Medical Record: [id number]   DO

## (undated) NOTE — LETTER
10/12/2017      RE: Sahara Gorman     : 1965    Dear Dr. Edna Angelo,    This letter is to inform you that your patient is being scheduled for surgery with Dr. Conrado Reno  on 10/25/2017 at [facility].    Diagnosis: Cervical myelopathy, cervical r

## (undated) NOTE — ED AVS SNAPSHOT
Kal Reynolds   MRN: [id number]    Department:  [facility] Emergency Department   Date of Visit:  9/12/2019           Disclosure     Insurance plans vary and the physician(s) referred by the ER may not be covered by your plan.  Please contac tell this physician (or your personal doctor if your instructions are to return to your personal doctor) about any new or lasting problems. The primary care or specialist physician will see patients referred from the [facility] Emergency Department.  Caroline Chew

## (undated) NOTE — Clinical Note
Patient has right ear skin infection. Lancet and gave Keflex.  Plan on C2-3 posterior fusion 12/6/17 Dr Jad Osman